# Patient Record
Sex: FEMALE | Race: WHITE | NOT HISPANIC OR LATINO | URBAN - METROPOLITAN AREA
[De-identification: names, ages, dates, MRNs, and addresses within clinical notes are randomized per-mention and may not be internally consistent; named-entity substitution may affect disease eponyms.]

---

## 2017-01-25 ENCOUNTER — COMMUNICATION - HEALTHEAST (OUTPATIENT)
Dept: FAMILY MEDICINE | Facility: CLINIC | Age: 62
End: 2017-01-25

## 2017-02-13 ENCOUNTER — COMMUNICATION - HEALTHEAST (OUTPATIENT)
Dept: FAMILY MEDICINE | Facility: CLINIC | Age: 62
End: 2017-02-13

## 2017-02-13 DIAGNOSIS — F32.A DEPRESSED: ICD-10-CM

## 2017-02-13 DIAGNOSIS — R52 PAIN: ICD-10-CM

## 2017-02-17 ENCOUNTER — HOSPITAL ENCOUNTER (OUTPATIENT)
Dept: MAMMOGRAPHY | Facility: HOSPITAL | Age: 62
Discharge: HOME | End: 2017-02-17
Attending: FAMILY MEDICINE

## 2017-02-17 DIAGNOSIS — Z12.31 VISIT FOR SCREENING MAMMOGRAM: ICD-10-CM

## 2017-03-13 ENCOUNTER — COMMUNICATION - HEALTHEAST (OUTPATIENT)
Dept: FAMILY MEDICINE | Facility: CLINIC | Age: 62
End: 2017-03-13

## 2017-03-13 DIAGNOSIS — R52 PAIN: ICD-10-CM

## 2017-04-10 ENCOUNTER — COMMUNICATION - HEALTHEAST (OUTPATIENT)
Dept: FAMILY MEDICINE | Facility: CLINIC | Age: 62
End: 2017-04-10

## 2017-04-10 DIAGNOSIS — E55.9 VITAMIN D DEFICIENCY DISEASE: ICD-10-CM

## 2017-05-15 ENCOUNTER — COMMUNICATION - HEALTHEAST (OUTPATIENT)
Dept: FAMILY MEDICINE | Facility: CLINIC | Age: 62
End: 2017-05-15

## 2017-05-15 DIAGNOSIS — K21.9 GERD (GASTROESOPHAGEAL REFLUX DISEASE): ICD-10-CM

## 2017-09-25 ENCOUNTER — COMMUNICATION - HEALTHEAST (OUTPATIENT)
Dept: FAMILY MEDICINE | Facility: CLINIC | Age: 62
End: 2017-09-25

## 2017-09-25 DIAGNOSIS — E55.9 VITAMIN D DEFICIENCY DISEASE: ICD-10-CM

## 2017-10-30 ENCOUNTER — COMMUNICATION - HEALTHEAST (OUTPATIENT)
Dept: FAMILY MEDICINE | Facility: CLINIC | Age: 62
End: 2017-10-30

## 2017-10-30 ENCOUNTER — OFFICE VISIT - HEALTHEAST (OUTPATIENT)
Dept: FAMILY MEDICINE | Facility: CLINIC | Age: 62
End: 2017-10-30

## 2017-10-30 DIAGNOSIS — G89.29 CHRONIC BILATERAL LOW BACK PAIN WITHOUT SCIATICA: ICD-10-CM

## 2017-10-30 DIAGNOSIS — F33.2 SEVERE EPISODE OF RECURRENT MAJOR DEPRESSIVE DISORDER, WITHOUT PSYCHOTIC FEATURES (H): ICD-10-CM

## 2017-10-30 DIAGNOSIS — Z23 NEED FOR VACCINATION: ICD-10-CM

## 2017-10-30 DIAGNOSIS — F41.1 ANXIETY STATE: ICD-10-CM

## 2017-10-30 DIAGNOSIS — Z12.11 SCREEN FOR COLON CANCER: ICD-10-CM

## 2017-10-30 DIAGNOSIS — K21.9 GASTROESOPHAGEAL REFLUX DISEASE WITHOUT ESOPHAGITIS: ICD-10-CM

## 2017-10-30 DIAGNOSIS — Z00.00 WELL ADULT EXAM: ICD-10-CM

## 2017-10-30 DIAGNOSIS — E78.49 OTHER HYPERLIPIDEMIA: ICD-10-CM

## 2017-10-30 DIAGNOSIS — Z82.0 FAMILY HISTORY OF ALZHEIMER'S DISEASE: ICD-10-CM

## 2017-10-30 DIAGNOSIS — M54.50 CHRONIC BILATERAL LOW BACK PAIN WITHOUT SCIATICA: ICD-10-CM

## 2017-10-30 DIAGNOSIS — E55.9 VITAMIN D DEFICIENCY: ICD-10-CM

## 2017-10-30 ASSESSMENT — MIFFLIN-ST. JEOR: SCORE: 1136.71

## 2017-10-31 ENCOUNTER — COMMUNICATION - HEALTHEAST (OUTPATIENT)
Dept: FAMILY MEDICINE | Facility: CLINIC | Age: 62
End: 2017-10-31

## 2017-10-31 DIAGNOSIS — R52 PAIN: ICD-10-CM

## 2017-11-01 ENCOUNTER — AMBULATORY - HEALTHEAST (OUTPATIENT)
Dept: LAB | Facility: CLINIC | Age: 62
End: 2017-11-01

## 2017-11-01 ENCOUNTER — COMMUNICATION - HEALTHEAST (OUTPATIENT)
Dept: FAMILY MEDICINE | Facility: CLINIC | Age: 62
End: 2017-11-01

## 2017-11-01 ENCOUNTER — AMBULATORY - HEALTHEAST (OUTPATIENT)
Dept: FAMILY MEDICINE | Facility: CLINIC | Age: 62
End: 2017-11-01

## 2017-11-01 DIAGNOSIS — M54.50 LOW BACK PAIN: ICD-10-CM

## 2017-11-01 DIAGNOSIS — E78.49 OTHER HYPERLIPIDEMIA: ICD-10-CM

## 2017-11-01 DIAGNOSIS — E55.9 VITAMIN D DEFICIENCY: ICD-10-CM

## 2017-11-01 LAB
CHOLEST SERPL-MCNC: 223 MG/DL
FASTING STATUS PATIENT QL REPORTED: YES
HDLC SERPL-MCNC: 44 MG/DL
LDLC SERPL CALC-MCNC: 150 MG/DL
TRIGL SERPL-MCNC: 147 MG/DL

## 2017-11-09 ENCOUNTER — COMMUNICATION - HEALTHEAST (OUTPATIENT)
Dept: FAMILY MEDICINE | Facility: CLINIC | Age: 62
End: 2017-11-09

## 2017-11-20 ENCOUNTER — COMMUNICATION - HEALTHEAST (OUTPATIENT)
Dept: FAMILY MEDICINE | Facility: CLINIC | Age: 62
End: 2017-11-20

## 2017-11-20 DIAGNOSIS — F32.A DEPRESSED: ICD-10-CM

## 2017-11-20 DIAGNOSIS — K21.9 GERD (GASTROESOPHAGEAL REFLUX DISEASE): ICD-10-CM

## 2018-01-23 ENCOUNTER — COMMUNICATION - HEALTHEAST (OUTPATIENT)
Dept: FAMILY MEDICINE | Facility: CLINIC | Age: 63
End: 2018-01-23

## 2018-01-23 DIAGNOSIS — E55.9 VITAMIN D DEFICIENCY DISEASE: ICD-10-CM

## 2018-06-04 ENCOUNTER — COMMUNICATION - HEALTHEAST (OUTPATIENT)
Dept: FAMILY MEDICINE | Facility: CLINIC | Age: 63
End: 2018-06-04

## 2018-06-04 DIAGNOSIS — R52 PAIN: ICD-10-CM

## 2018-07-09 ENCOUNTER — COMMUNICATION - HEALTHEAST (OUTPATIENT)
Dept: FAMILY MEDICINE | Facility: CLINIC | Age: 63
End: 2018-07-09

## 2018-07-09 DIAGNOSIS — F32.A DEPRESSED: ICD-10-CM

## 2018-07-09 DIAGNOSIS — K21.9 GASTROESOPHAGEAL REFLUX DISEASE WITHOUT ESOPHAGITIS: ICD-10-CM

## 2018-08-06 ENCOUNTER — COMMUNICATION - HEALTHEAST (OUTPATIENT)
Dept: FAMILY MEDICINE | Facility: CLINIC | Age: 63
End: 2018-08-06

## 2018-08-06 DIAGNOSIS — R52 PAIN: ICD-10-CM

## 2018-10-01 ENCOUNTER — COMMUNICATION - HEALTHEAST (OUTPATIENT)
Dept: FAMILY MEDICINE | Facility: CLINIC | Age: 63
End: 2018-10-01

## 2018-10-01 DIAGNOSIS — E55.9 VITAMIN D DEFICIENCY DISEASE: ICD-10-CM

## 2018-10-01 DIAGNOSIS — R52 PAIN: ICD-10-CM

## 2018-10-10 ENCOUNTER — RECORDS - HEALTHEAST (OUTPATIENT)
Dept: GENERAL RADIOLOGY | Facility: CLINIC | Age: 63
End: 2018-10-10

## 2018-10-10 ENCOUNTER — OFFICE VISIT - HEALTHEAST (OUTPATIENT)
Dept: FAMILY MEDICINE | Facility: CLINIC | Age: 63
End: 2018-10-10

## 2018-10-10 DIAGNOSIS — F33.2 SEVERE EPISODE OF RECURRENT MAJOR DEPRESSIVE DISORDER (H): ICD-10-CM

## 2018-10-10 DIAGNOSIS — M25.551 RIGHT HIP PAIN: ICD-10-CM

## 2018-10-10 DIAGNOSIS — M25.551 PAIN IN RIGHT HIP: ICD-10-CM

## 2018-10-10 DIAGNOSIS — R41.3 MEMORY DIFFICULTY: ICD-10-CM

## 2018-10-10 DIAGNOSIS — M54.50 LOW BACK PAIN: ICD-10-CM

## 2018-10-10 DIAGNOSIS — F41.1 ANXIETY STATE: ICD-10-CM

## 2018-10-10 DIAGNOSIS — K21.9 ESOPHAGEAL REFLUX: ICD-10-CM

## 2018-10-10 DIAGNOSIS — J32.9 SINUSITIS: ICD-10-CM

## 2018-10-10 DIAGNOSIS — Z82.0 FAMILY HISTORY OF ALZHEIMER'S DISEASE: ICD-10-CM

## 2018-10-10 DIAGNOSIS — F41.0 PANIC DISORDER WITHOUT AGORAPHOBIA: ICD-10-CM

## 2018-10-10 ASSESSMENT — MIFFLIN-ST. JEOR: SCORE: 1148.9

## 2018-10-17 ENCOUNTER — HOSPITAL ENCOUNTER (OUTPATIENT)
Dept: MAMMOGRAPHY | Facility: CLINIC | Age: 63
Discharge: HOME | End: 2018-10-17
Attending: FAMILY MEDICINE

## 2018-10-17 DIAGNOSIS — Z12.31 VISIT FOR SCREENING MAMMOGRAM: ICD-10-CM

## 2018-10-18 ENCOUNTER — OFFICE VISIT - HEALTHEAST (OUTPATIENT)
Dept: FAMILY MEDICINE | Facility: CLINIC | Age: 63
End: 2018-10-18

## 2018-10-18 DIAGNOSIS — J01.40 ACUTE NON-RECURRENT PANSINUSITIS: ICD-10-CM

## 2018-10-18 ASSESSMENT — MIFFLIN-ST. JEOR: SCORE: 1148.9

## 2018-11-06 ENCOUNTER — OFFICE VISIT - HEALTHEAST (OUTPATIENT)
Dept: FAMILY MEDICINE | Facility: CLINIC | Age: 63
End: 2018-11-06

## 2018-11-06 DIAGNOSIS — F33.2 SEVERE EPISODE OF RECURRENT MAJOR DEPRESSIVE DISORDER (H): ICD-10-CM

## 2018-11-06 DIAGNOSIS — R51.9 HEADACHE: ICD-10-CM

## 2018-11-06 DIAGNOSIS — F41.1 ANXIETY STATE: ICD-10-CM

## 2018-11-06 DIAGNOSIS — Z12.11 SCREEN FOR COLON CANCER: ICD-10-CM

## 2018-11-06 DIAGNOSIS — Z82.0 FAMILY HISTORY OF ALZHEIMER'S DISEASE: ICD-10-CM

## 2018-11-06 DIAGNOSIS — F09 COGNITIVE DISORDER: ICD-10-CM

## 2018-11-06 DIAGNOSIS — E78.5 HYPERLIPEMIA: ICD-10-CM

## 2018-11-06 DIAGNOSIS — Z00.01 ENCOUNTER FOR GENERAL ADULT MEDICAL EXAMINATION WITH ABNORMAL FINDINGS: ICD-10-CM

## 2018-11-06 DIAGNOSIS — F60.9 PERSONALITY DISORDER (H): ICD-10-CM

## 2018-11-06 DIAGNOSIS — Z23 NEED FOR VACCINATION: ICD-10-CM

## 2018-11-06 DIAGNOSIS — E55.9 VITAMIN D DEFICIENCY: ICD-10-CM

## 2018-11-06 DIAGNOSIS — F41.0 PANIC DISORDER WITHOUT AGORAPHOBIA: ICD-10-CM

## 2018-11-06 DIAGNOSIS — K21.9 ESOPHAGEAL REFLUX: ICD-10-CM

## 2018-11-06 DIAGNOSIS — K21.9 GASTROESOPHAGEAL REFLUX DISEASE WITHOUT ESOPHAGITIS: ICD-10-CM

## 2018-11-06 DIAGNOSIS — F39 MOOD DISORDER (H): ICD-10-CM

## 2018-11-06 DIAGNOSIS — M65.30 TRIGGER FINGER, ACQUIRED: ICD-10-CM

## 2018-11-06 DIAGNOSIS — M54.50 LOW BACK PAIN: ICD-10-CM

## 2018-11-06 LAB
ALBUMIN SERPL-MCNC: 3.8 G/DL (ref 3.5–5)
ALP SERPL-CCNC: 115 U/L (ref 45–120)
ALT SERPL W P-5'-P-CCNC: 23 U/L (ref 0–45)
ANION GAP SERPL CALCULATED.3IONS-SCNC: 9 MMOL/L (ref 5–18)
AST SERPL W P-5'-P-CCNC: 17 U/L (ref 0–40)
BILIRUB SERPL-MCNC: 0.5 MG/DL (ref 0–1)
BUN SERPL-MCNC: 12 MG/DL (ref 8–22)
CALCIUM SERPL-MCNC: 9.6 MG/DL (ref 8.5–10.5)
CHLORIDE BLD-SCNC: 104 MMOL/L (ref 98–107)
CHOLEST SERPL-MCNC: 246 MG/DL
CO2 SERPL-SCNC: 26 MMOL/L (ref 22–31)
CREAT SERPL-MCNC: 0.83 MG/DL (ref 0.6–1.1)
FASTING STATUS PATIENT QL REPORTED: YES
GFR SERPL CREATININE-BSD FRML MDRD: >60 ML/MIN/1.73M2
GLUCOSE BLD-MCNC: 93 MG/DL (ref 70–125)
HDLC SERPL-MCNC: 50 MG/DL
LDLC SERPL CALC-MCNC: 161 MG/DL
POTASSIUM BLD-SCNC: 4.5 MMOL/L (ref 3.5–5)
PROT SERPL-MCNC: 6.8 G/DL (ref 6–8)
SODIUM SERPL-SCNC: 139 MMOL/L (ref 136–145)
TRIGL SERPL-MCNC: 174 MG/DL

## 2018-11-06 ASSESSMENT — MIFFLIN-ST. JEOR: SCORE: 1154.01

## 2018-11-07 ENCOUNTER — COMMUNICATION - HEALTHEAST (OUTPATIENT)
Dept: FAMILY MEDICINE | Facility: CLINIC | Age: 63
End: 2018-11-07

## 2018-11-07 LAB — 25(OH)D3 SERPL-MCNC: 36.3 NG/ML (ref 30–80)

## 2018-11-08 ENCOUNTER — COMMUNICATION - HEALTHEAST (OUTPATIENT)
Dept: FAMILY MEDICINE | Facility: CLINIC | Age: 63
End: 2018-11-08

## 2018-11-14 ENCOUNTER — COMMUNICATION - HEALTHEAST (OUTPATIENT)
Dept: FAMILY MEDICINE | Facility: CLINIC | Age: 63
End: 2018-11-14

## 2018-11-14 DIAGNOSIS — Z23 NEED FOR VACCINATION: ICD-10-CM

## 2018-11-15 ENCOUNTER — AMBULATORY - HEALTHEAST (OUTPATIENT)
Dept: NURSING | Facility: CLINIC | Age: 63
End: 2018-11-15

## 2018-11-19 ENCOUNTER — COMMUNICATION - HEALTHEAST (OUTPATIENT)
Dept: FAMILY MEDICINE | Facility: CLINIC | Age: 63
End: 2018-11-19

## 2019-01-02 ENCOUNTER — RECORDS - HEALTHEAST (OUTPATIENT)
Dept: ADMINISTRATIVE | Facility: OTHER | Age: 64
End: 2019-01-02

## 2019-01-03 ENCOUNTER — RECORDS - HEALTHEAST (OUTPATIENT)
Dept: ADMINISTRATIVE | Facility: OTHER | Age: 64
End: 2019-01-03

## 2019-01-03 ENCOUNTER — AMBULATORY - HEALTHEAST (OUTPATIENT)
Dept: NEUROLOGY | Facility: CLINIC | Age: 64
End: 2019-01-03

## 2019-01-03 ENCOUNTER — RECORDS - HEALTHEAST (OUTPATIENT)
Dept: LAB | Facility: HOSPITAL | Age: 64
End: 2019-01-03

## 2019-01-03 DIAGNOSIS — R41.89 SUBJECTIVE MEMORY COMPLAINTS: ICD-10-CM

## 2019-01-03 LAB
TSH SERPL DL<=0.005 MIU/L-ACNC: 2.33 UIU/ML (ref 0.3–5)
VIT B12 SERPL-MCNC: 389 PG/ML (ref 213–816)

## 2019-01-04 ENCOUNTER — COMMUNICATION - HEALTHEAST (OUTPATIENT)
Dept: FAMILY MEDICINE | Facility: CLINIC | Age: 64
End: 2019-01-04

## 2019-01-04 DIAGNOSIS — F32.A DEPRESSION: ICD-10-CM

## 2019-01-04 DIAGNOSIS — R52 PAIN: ICD-10-CM

## 2019-01-04 DIAGNOSIS — K21.9 GASTROESOPHAGEAL REFLUX DISEASE WITHOUT ESOPHAGITIS: ICD-10-CM

## 2019-01-04 DIAGNOSIS — E55.9 VITAMIN D DEFICIENCY DISEASE: ICD-10-CM

## 2019-01-08 ENCOUNTER — RECORDS - HEALTHEAST (OUTPATIENT)
Dept: ADMINISTRATIVE | Facility: OTHER | Age: 64
End: 2019-01-08

## 2019-01-08 ENCOUNTER — COMMUNICATION - HEALTHEAST (OUTPATIENT)
Dept: FAMILY MEDICINE | Facility: CLINIC | Age: 64
End: 2019-01-08

## 2019-01-08 DIAGNOSIS — R52 PAIN: ICD-10-CM

## 2019-01-18 ENCOUNTER — COMMUNICATION - HEALTHEAST (OUTPATIENT)
Dept: ADMINISTRATIVE | Facility: CLINIC | Age: 64
End: 2019-01-18

## 2019-02-01 ENCOUNTER — HOSPITAL ENCOUNTER (OUTPATIENT)
Dept: NEUROLOGY | Facility: CLINIC | Age: 64
Setting detail: THERAPIES SERIES
Discharge: STILL ADMITTED OR ADMINISTRATIVE | End: 2019-02-01
Attending: PSYCHIATRY & NEUROLOGY

## 2019-02-01 DIAGNOSIS — F41.1 ANXIETY STATE: ICD-10-CM

## 2019-02-01 DIAGNOSIS — E78.49 OTHER HYPERLIPIDEMIA: ICD-10-CM

## 2019-02-01 DIAGNOSIS — F33.9 EPISODE OF RECURRENT MAJOR DEPRESSIVE DISORDER, UNSPECIFIED DEPRESSION EPISODE SEVERITY (H): ICD-10-CM

## 2019-02-01 DIAGNOSIS — R41.89 SUBJECTIVE MEMORY COMPLAINTS: ICD-10-CM

## 2019-02-01 DIAGNOSIS — Z85.3 HISTORY OF BREAST CANCER: ICD-10-CM

## 2019-03-07 ENCOUNTER — RECORDS - HEALTHEAST (OUTPATIENT)
Dept: ADMINISTRATIVE | Facility: OTHER | Age: 64
End: 2019-03-07

## 2019-03-08 ENCOUNTER — HOSPITAL ENCOUNTER (OUTPATIENT)
Dept: NEUROLOGY | Facility: CLINIC | Age: 64
Setting detail: THERAPIES SERIES
Discharge: STILL ADMITTED OR ADMINISTRATIVE | End: 2019-03-08
Attending: PSYCHIATRY & NEUROLOGY

## 2019-03-08 DIAGNOSIS — F60.9 PERSONALITY DISORDER (H): ICD-10-CM

## 2019-03-08 DIAGNOSIS — F32.9 MAJOR DEPRESSIVE DISORDER WITH CURRENT ACTIVE EPISODE, UNSPECIFIED DEPRESSION EPISODE SEVERITY, UNSPECIFIED WHETHER RECURRENT: ICD-10-CM

## 2019-03-08 DIAGNOSIS — F41.0 PANIC DISORDER WITHOUT AGORAPHOBIA: ICD-10-CM

## 2019-03-08 DIAGNOSIS — F41.1 ANXIETY STATE: ICD-10-CM

## 2019-03-08 DIAGNOSIS — Z85.3 HISTORY OF BREAST CANCER: ICD-10-CM

## 2019-05-13 ENCOUNTER — COMMUNICATION - HEALTHEAST (OUTPATIENT)
Dept: FAMILY MEDICINE | Facility: CLINIC | Age: 64
End: 2019-05-13

## 2019-10-07 ENCOUNTER — OFFICE VISIT - HEALTHEAST (OUTPATIENT)
Dept: FAMILY MEDICINE | Facility: CLINIC | Age: 64
End: 2019-10-07

## 2019-10-07 DIAGNOSIS — Z13.820 SCREENING FOR OSTEOPOROSIS: ICD-10-CM

## 2019-10-07 DIAGNOSIS — Z12.83 SKIN CANCER SCREENING: ICD-10-CM

## 2019-10-07 DIAGNOSIS — F60.9 PERSONALITY DISORDER (H): ICD-10-CM

## 2019-10-07 DIAGNOSIS — Z12.11 SCREENING FOR COLON CANCER: ICD-10-CM

## 2019-10-07 DIAGNOSIS — F09 COGNITIVE DISORDER: ICD-10-CM

## 2019-10-07 DIAGNOSIS — Z12.39 SCREENING FOR BREAST CANCER: ICD-10-CM

## 2019-10-07 DIAGNOSIS — F39 MOOD DISORDER (H): ICD-10-CM

## 2019-10-07 DIAGNOSIS — K21.9 GASTROESOPHAGEAL REFLUX DISEASE WITHOUT ESOPHAGITIS: ICD-10-CM

## 2019-10-07 DIAGNOSIS — Z85.3 HISTORY OF BREAST CANCER: ICD-10-CM

## 2019-10-07 DIAGNOSIS — Z00.00 ROUTINE GENERAL MEDICAL EXAMINATION AT A HEALTH CARE FACILITY: ICD-10-CM

## 2019-10-07 DIAGNOSIS — J30.2 SEASONAL ALLERGIC RHINITIS, UNSPECIFIED TRIGGER: ICD-10-CM

## 2019-10-07 DIAGNOSIS — Z78.0 POSTMENOPAUSAL: ICD-10-CM

## 2019-10-07 DIAGNOSIS — F41.1 ANXIETY STATE: ICD-10-CM

## 2019-10-07 DIAGNOSIS — Z11.59 ENCOUNTER FOR HEPATITIS C SCREENING TEST FOR LOW RISK PATIENT: ICD-10-CM

## 2019-10-07 DIAGNOSIS — E78.49 OTHER HYPERLIPIDEMIA: ICD-10-CM

## 2019-10-07 DIAGNOSIS — F33.2 SEVERE EPISODE OF RECURRENT MAJOR DEPRESSIVE DISORDER, WITHOUT PSYCHOTIC FEATURES (H): ICD-10-CM

## 2019-10-07 DIAGNOSIS — E55.9 VITAMIN D DEFICIENCY DISEASE: ICD-10-CM

## 2019-10-07 DIAGNOSIS — Z12.31 VISIT FOR SCREENING MAMMOGRAM: ICD-10-CM

## 2019-10-07 DIAGNOSIS — M65.30 TRIGGER FINGER, ACQUIRED: ICD-10-CM

## 2019-10-07 LAB
ALBUMIN SERPL-MCNC: 3.7 G/DL (ref 3.5–5)
ALP SERPL-CCNC: 102 U/L (ref 45–120)
ALT SERPL W P-5'-P-CCNC: 21 U/L (ref 0–45)
ANION GAP SERPL CALCULATED.3IONS-SCNC: 9 MMOL/L (ref 5–18)
AST SERPL W P-5'-P-CCNC: 14 U/L (ref 0–40)
BILIRUB SERPL-MCNC: 0.3 MG/DL (ref 0–1)
BUN SERPL-MCNC: 13 MG/DL (ref 8–22)
CALCIUM SERPL-MCNC: 9.3 MG/DL (ref 8.5–10.5)
CHLORIDE BLD-SCNC: 105 MMOL/L (ref 98–107)
CHOLEST SERPL-MCNC: 222 MG/DL
CO2 SERPL-SCNC: 25 MMOL/L (ref 22–31)
CREAT SERPL-MCNC: 0.95 MG/DL (ref 0.6–1.1)
FASTING STATUS PATIENT QL REPORTED: YES
GFR SERPL CREATININE-BSD FRML MDRD: 59 ML/MIN/1.73M2
GLUCOSE BLD-MCNC: 82 MG/DL (ref 70–125)
HDLC SERPL-MCNC: 46 MG/DL
LDLC SERPL CALC-MCNC: 143 MG/DL
POTASSIUM BLD-SCNC: 4.4 MMOL/L (ref 3.5–5)
PROT SERPL-MCNC: 6.5 G/DL (ref 6–8)
SODIUM SERPL-SCNC: 139 MMOL/L (ref 136–145)
TRIGL SERPL-MCNC: 167 MG/DL

## 2019-10-07 ASSESSMENT — ANXIETY QUESTIONNAIRES
3. WORRYING TOO MUCH ABOUT DIFFERENT THINGS: NOT AT ALL
5. BEING SO RESTLESS THAT IT IS HARD TO SIT STILL: SEVERAL DAYS
6. BECOMING EASILY ANNOYED OR IRRITABLE: NOT AT ALL
7. FEELING AFRAID AS IF SOMETHING AWFUL MIGHT HAPPEN: NOT AT ALL
GAD7 TOTAL SCORE: 3
4. TROUBLE RELAXING: SEVERAL DAYS
2. NOT BEING ABLE TO STOP OR CONTROL WORRYING: NOT AT ALL
1. FEELING NERVOUS, ANXIOUS, OR ON EDGE: SEVERAL DAYS

## 2019-10-07 ASSESSMENT — MIFFLIN-ST. JEOR: SCORE: 1140.12

## 2019-10-07 ASSESSMENT — PATIENT HEALTH QUESTIONNAIRE - PHQ9: SUM OF ALL RESPONSES TO PHQ QUESTIONS 1-9: 4

## 2019-10-08 ENCOUNTER — COMMUNICATION - HEALTHEAST (OUTPATIENT)
Dept: FAMILY MEDICINE | Facility: CLINIC | Age: 64
End: 2019-10-08

## 2019-10-08 LAB
25(OH)D3 SERPL-MCNC: 51 NG/ML (ref 30–80)
HCV AB SERPL QL IA: NEGATIVE

## 2019-10-18 ENCOUNTER — RECORDS - HEALTHEAST (OUTPATIENT)
Dept: MAMMOGRAPHY | Facility: CLINIC | Age: 64
End: 2019-10-18

## 2019-10-18 ENCOUNTER — RECORDS - HEALTHEAST (OUTPATIENT)
Dept: BONE DENSITY | Facility: CLINIC | Age: 64
End: 2019-10-18

## 2019-10-18 ENCOUNTER — RECORDS - HEALTHEAST (OUTPATIENT)
Dept: ADMINISTRATIVE | Facility: OTHER | Age: 64
End: 2019-10-18

## 2019-10-18 DIAGNOSIS — Z78.0 ASYMPTOMATIC MENOPAUSAL STATE: ICD-10-CM

## 2019-10-18 DIAGNOSIS — Z12.31 ENCOUNTER FOR SCREENING MAMMOGRAM FOR MALIGNANT NEOPLASM OF BREAST: ICD-10-CM

## 2019-10-21 ENCOUNTER — COMMUNICATION - HEALTHEAST (OUTPATIENT)
Dept: FAMILY MEDICINE | Facility: CLINIC | Age: 64
End: 2019-10-21

## 2019-11-14 ENCOUNTER — AMBULATORY - HEALTHEAST (OUTPATIENT)
Dept: NURSING | Facility: CLINIC | Age: 64
End: 2019-11-14

## 2019-11-14 ENCOUNTER — COMMUNICATION - HEALTHEAST (OUTPATIENT)
Dept: FAMILY MEDICINE | Facility: CLINIC | Age: 64
End: 2019-11-14

## 2019-11-14 DIAGNOSIS — Z23 NEED FOR VACCINATION: ICD-10-CM

## 2019-11-20 ENCOUNTER — RECORDS - HEALTHEAST (OUTPATIENT)
Dept: ADMINISTRATIVE | Facility: OTHER | Age: 64
End: 2019-11-20

## 2019-12-11 ENCOUNTER — COMMUNICATION - HEALTHEAST (OUTPATIENT)
Dept: FAMILY MEDICINE | Facility: CLINIC | Age: 64
End: 2019-12-11

## 2019-12-11 DIAGNOSIS — K21.9 GASTROESOPHAGEAL REFLUX DISEASE WITHOUT ESOPHAGITIS: ICD-10-CM

## 2019-12-16 ENCOUNTER — RECORDS - HEALTHEAST (OUTPATIENT)
Dept: FAMILY MEDICINE | Facility: CLINIC | Age: 64
End: 2019-12-16

## 2019-12-16 ASSESSMENT — MIFFLIN-ST. JEOR: SCORE: 1139.83

## 2019-12-18 ENCOUNTER — COMMUNICATION - HEALTHEAST (OUTPATIENT)
Dept: FAMILY MEDICINE | Facility: CLINIC | Age: 64
End: 2019-12-18

## 2019-12-18 ENCOUNTER — ANESTHESIA - HEALTHEAST (OUTPATIENT)
Dept: SURGERY | Facility: AMBULATORY SURGERY CENTER | Age: 64
End: 2019-12-18

## 2019-12-18 DIAGNOSIS — R52 PAIN: ICD-10-CM

## 2019-12-19 ENCOUNTER — COMMUNICATION - HEALTHEAST (OUTPATIENT)
Dept: SURGERY | Facility: CLINIC | Age: 64
End: 2019-12-19

## 2019-12-19 ENCOUNTER — SURGERY - HEALTHEAST (OUTPATIENT)
Dept: SURGERY | Facility: AMBULATORY SURGERY CENTER | Age: 64
End: 2019-12-19

## 2019-12-26 ENCOUNTER — COMMUNICATION - HEALTHEAST (OUTPATIENT)
Dept: FAMILY MEDICINE | Facility: CLINIC | Age: 64
End: 2019-12-26

## 2019-12-26 DIAGNOSIS — K21.9 GASTROESOPHAGEAL REFLUX DISEASE WITHOUT ESOPHAGITIS: ICD-10-CM

## 2020-01-24 ENCOUNTER — COMMUNICATION - HEALTHEAST (OUTPATIENT)
Dept: SCHEDULING | Facility: CLINIC | Age: 65
End: 2020-01-24

## 2020-01-24 DIAGNOSIS — J01.90 ACUTE SINUSITIS WITH SYMPTOMS > 10 DAYS: ICD-10-CM

## 2020-03-15 ENCOUNTER — COMMUNICATION - HEALTHEAST (OUTPATIENT)
Dept: FAMILY MEDICINE | Facility: CLINIC | Age: 65
End: 2020-03-15

## 2020-03-15 DIAGNOSIS — F32.A DEPRESSION: ICD-10-CM

## 2020-05-28 ENCOUNTER — COMMUNICATION - HEALTHEAST (OUTPATIENT)
Dept: SCHEDULING | Facility: CLINIC | Age: 65
End: 2020-05-28

## 2020-05-28 DIAGNOSIS — Z11.59 SCREENING FOR VIRAL DISEASE: ICD-10-CM

## 2020-11-16 ENCOUNTER — COMMUNICATION - HEALTHEAST (OUTPATIENT)
Dept: FAMILY MEDICINE | Facility: CLINIC | Age: 65
End: 2020-11-16

## 2020-11-16 DIAGNOSIS — K21.9 GASTROESOPHAGEAL REFLUX DISEASE WITHOUT ESOPHAGITIS: ICD-10-CM

## 2020-11-16 DIAGNOSIS — Z78.0 POSTMENOPAUSAL: ICD-10-CM

## 2020-11-16 DIAGNOSIS — R52 PAIN: ICD-10-CM

## 2020-11-16 DIAGNOSIS — Z12.31 ENCOUNTER FOR SCREENING MAMMOGRAM FOR MALIGNANT NEOPLASM OF BREAST: ICD-10-CM

## 2020-11-16 DIAGNOSIS — F32.A DEPRESSION: ICD-10-CM

## 2020-11-16 DIAGNOSIS — J30.2 SEASONAL ALLERGIC RHINITIS, UNSPECIFIED TRIGGER: ICD-10-CM

## 2020-11-16 DIAGNOSIS — E55.9 VITAMIN D DEFICIENCY DISEASE: ICD-10-CM

## 2020-11-16 RX ORDER — FAMOTIDINE 20 MG/1
20 TABLET, FILM COATED ORAL 2 TIMES DAILY PRN
Qty: 180 TABLET | Refills: 1 | Status: SHIPPED | OUTPATIENT
Start: 2020-11-16 | End: 2021-09-10

## 2020-11-16 RX ORDER — VENLAFAXINE HYDROCHLORIDE 75 MG/1
CAPSULE, EXTENDED RELEASE ORAL
Qty: 90 CAPSULE | Refills: 1 | Status: SHIPPED | OUTPATIENT
Start: 2020-11-16

## 2020-11-16 RX ORDER — LORATADINE 10 MG/1
10 TABLET ORAL DAILY
Qty: 90 TABLET | Refills: 1 | Status: SHIPPED | OUTPATIENT
Start: 2020-11-16

## 2020-11-18 RX ORDER — IBUPROFEN 600 MG/1
TABLET, FILM COATED ORAL
Qty: 180 TABLET | Refills: 2 | Status: SHIPPED | OUTPATIENT
Start: 2020-11-18

## 2020-12-07 ENCOUNTER — TELEPHONE (OUTPATIENT)
Dept: FAMILY MEDICINE | Facility: CLINIC | Age: 65
End: 2020-12-07

## 2020-12-07 DIAGNOSIS — Z20.822 EXPOSURE TO COVID-19 VIRUS: ICD-10-CM

## 2020-12-07 DIAGNOSIS — Z20.822 EXPOSURE TO COVID-19 VIRUS: Primary | ICD-10-CM

## 2020-12-07 LAB
SARS-COV-2 RNA SPEC QL NAA+PROBE: NOT DETECTED
SPECIMEN SOURCE: NORMAL

## 2020-12-07 PROCEDURE — 99207 PR NO CHARGE LOS: CPT | Performed by: STUDENT IN AN ORGANIZED HEALTH CARE EDUCATION/TRAINING PROGRAM

## 2020-12-07 NOTE — TELEPHONE ENCOUNTER
"Patient is requesting COVID-19 PCR testing. They are currently asymptomatic, and meet the following criteria for testing per the July 14, 2020 [facility] testing guidelines: 7/14 Asymptomatic criteria: has had contact within 6 feet for >/= 15 min of COVID-19 cases. Patient was exposed Wednesday, 12/2/20.     If patient has had close contact (within 6 ft for >/= 15 min), recommend patient gets PCR testing between days 5-7 after exposure. Also recommend 14 day quarantine per [facility]:     \"All close contacts should follow a 14-day quarantine period. CDC recommends that close contacts be PCR-tested for COVID-19. Even if the result is negative, these contacts should continue to quarantine for a full 14 days after last exposure and monitor for symptoms; infection could develop at any time during the quarantine period. Repeat testing at the end of the quarantine period may be recommended for staff or residents of congregate settings; refer to setting-specific testing guidance for additional information. Specific guidance is available for health care workers who have a health care exposure and these individuals should follow that guidance; it would apply if they have a community/household contact.\"  (https://[email or web address]/communities/ep/rojas/2020/zpwt01ubfrdtxw.pdf):      Patient transferred to  to schedule 20 min test only (no charge) visit with [facility] provider or curbside test with [facility]. ./LR      "

## 2020-12-07 NOTE — TELEPHONE ENCOUNTER
[facility] phone call message- general phone call:    Reason for call: Pt calling to get scheduled for covid testing, pt has been exposed.     Return call needed: Yes    OK to leave a message on voice mail? Yes    Primary language: Not on file      needed? Not on file    Call taken on December 7, 2020 at 9:53 AM by Grisel Flores-Cardona

## 2020-12-08 ENCOUNTER — TELEPHONE (OUTPATIENT)
Dept: FAMILY MEDICINE | Facility: CLINIC | Age: 65
End: 2020-12-08

## 2020-12-08 NOTE — TELEPHONE ENCOUNTER
Informed patient of negative COVID-19 results.    If patient had close exposure to someone with known COVID-19 (within 6 ft >15 min), patient needs to quarantine for a full 14 days after last exposure. Per [facility]:    Exposure is defined as being within 6 feet for more than 15 minutes to persons with confirmed COVID-19. All close contacts should follow a 14-day quarantine period. Even if the result is negative, these contacts should continue to quarantine for a full 14 days after last exposure and monitor for symptoms; infection could develop at any time during the quarantine period. Repeat testing at the end of the quarantine period may be recommended for staff or residents of congregate settings (acute care, assisted living, skilled nursing, group homes, long-term care facilities, substance use disorder treatment centers, homeless shelters, and correctional facilities).   https://www.[email or web address]/diseases/coronavirus/hcp/eval.html    If patient continues to have ongoing exposure to someone with known COVID-19 (e.g. parent, caretaker), patient needs to quarantine for 14 days after the last exposure to this person during their (the exposure's) 10 days in quarantine. As long as the exposure has no prolonged fever or symptoms (which can extend quarantine time), patient will have to isolate for:    10 days + 14 days after date of exposure's initial symptoms for those exposed to symptomatic patients  10 days + 14 days after date of exposure's positive test for those exposed to asymptomatic patients      If patient was symptomatic at time of testing and remains symptomatic for >72 hours after time of test, can repeat COVID-19 testing.    ./LR

## 2021-05-26 ASSESSMENT — PATIENT HEALTH QUESTIONNAIRE - PHQ9: SUM OF ALL RESPONSES TO PHQ QUESTIONS 1-9: 4

## 2021-05-28 ASSESSMENT — ANXIETY QUESTIONNAIRES: GAD7 TOTAL SCORE: 3

## 2021-05-31 VITALS — BODY MASS INDEX: 22.55 KG/M2 | WEIGHT: 135.31 LBS | HEIGHT: 65 IN

## 2021-06-02 VITALS — HEIGHT: 65 IN | BODY MASS INDEX: 22.99 KG/M2 | WEIGHT: 138 LBS

## 2021-06-02 VITALS — BODY MASS INDEX: 22.99 KG/M2 | HEIGHT: 65 IN | WEIGHT: 138 LBS

## 2021-06-02 VITALS — BODY MASS INDEX: 23.18 KG/M2 | WEIGHT: 139.13 LBS | HEIGHT: 65 IN

## 2021-06-02 NOTE — PROGRESS NOTES
Assessment and Plan:       1. Routine general medical examination at a health care facility  I encouraged her to eat a healthy diet and exercise on a regular basis.  We are going to check fasting labs today.    2. Severe episode of recurrent major depressive disorder, without psychotic features (H)  Well controlled and stable on venlafaxine XR 75 mg daily.    3. Anxiety Disorder NOS  Stable on venlafaxine    4. Personality disorder (H)  Stable on venlafaxine    5. Mood disorder (H)  Stable on venlafaxine    6. Cognitive disorder  Stable on venlafaxine    7. Other hyperlipidemia  I encouraged her to eat healthy foods and get regular exercise.  We are going to check a CMP and cholesterol panel today.  - Comprehensive Metabolic Panel  - Lipid Cascade    8. Trigger Finger (Acquired)  She was given a referral to see an orthopedic hand specialist.  - Ambulatory referral to Orthopedic Surgery    9. Vitamin D deficiency disease  Continue the vitamin D supplement.  I also recommended she add a calcium supplement and we will be checking a DEXA scan.  - cholecalciferol, vitamin D3, (VITAMIN D3) 2,000 unit capsule; Take 1 capsule (2,000 Units total) by mouth daily.  Dispense: 90 capsule; Refill: 3  - calcium carbonate (OS-MANNY) 600 mg calcium (1,500 mg) tablet; Take 1 tablet (600 mg total) by mouth 2 (two) times a day.  Dispense: 180 tablet; Refill: 3  - Vitamin D, Total (25-Hydroxy)    10. Gastroesophageal reflux disease without esophagitis  Well-controlled with ranitidine as needed.  - ranitidine (ZANTAC) 150 MG tablet; Take 1 tablet (150 mg total) by mouth 2 (two) times a day.  Dispense: 180 tablet; Refill: 3    11. Seasonal allergic rhinitis, unspecified trigger  Stable on loratadine as needed  - loratadine (CLARITIN) 10 mg tablet; Take 1 tablet (10 mg total) by mouth daily.  Dispense: 30 tablet; Refill: 3    12. Screening for osteoporosis  We will be checking a DEXA scan.    13. Visit for screening mammogram  - Mammo  Screening Bilateral; Future    14. History of breast cancer  She will be getting another mammogram soon.    16. Encounter for hepatitis C screening test for low risk patient  - Hepatitis C Antibody (Anti-HCV)    17. Postmenopausal  - DXA Bone Density Scan; Future    18. Screening for colon cancer  - Ambulatory referral for Colonoscopy    19. Skin cancer screening  - Ambulatory referral to Dermatology     The patient's current medical problems were reviewed.    The following are part of a depression follow up plan for the patient:  mental health care assessment  The following health maintenance schedule was reviewed with the patient and provided in printed form in the after visit summary:   Health Maintenance   Topic Date Due     HEPATITIS C SCREENING  1955     COLONOSCOPY  05/30/2003     DXA SCAN  05/30/2018     MAMMOGRAM  10/17/2019     PNEUMOCOCCAL IMMUNIZATION 65+ LOW/MEDIUM RISK (2 of 2 - PPSV23) 11/06/2019     DEPRESSION FOLLOW UP  04/07/2020     MEDICARE ANNUAL WELLNESS VISIT  10/07/2020     FALL RISK ASSESSMENT  10/07/2020     ADVANCE CARE PLANNING  11/06/2023     TD 18+ HE  09/14/2029     INFLUENZA VACCINE RULE BASED  Completed     ZOSTER VACCINES  Completed        Subjective:   Chief Complaint: Candance M Brown is an 64 y.o. female here for an Annual Wellness visit.   HPI:  She has a history of anxiety, depression, panic episodes, hyperlipidemia, GERD, breast cancer, seasonal allergies, chronic low back pain, trigger fingers and vitamin D deficiency.  She denies having concerns regarding urination, bowel movements, sleep or mood.  She is currently on venlafaxine 75 mg daily which is working well for her.  She was seen by neurology and had a neuropsychological evaluation last spring due to concerns about memory difficulty.  It was felt that her memory struggles were most likely related to stress and anxiety.  His part of her work-up she had a vitamin B12 and TSH level checked which were normal on  1/3/2019.  On 11/6/2018 her LDL cholesterol was 161, CMP normal, vitamin D level 36.3.    Overall, she reports that she is feeling healthy.  She takes ranitidine as needed for heartburn and loratadine for allergies which have been helpful.  She is concerned about ongoing issues with trigger fingers in both hands.  She would like to see an orthopedic surgeon to talk about treatment options.     Social history: Single.    Review of Systems:    Please see above.  The rest of the review of systems are negative for all systems.    Patient Care Team:  Saman Matamoros DO as PCP - General  Saman Matamoros DO as Assigned PCP     Patient Active Problem List   Diagnosis     Vitamin D Deficiency     Severe Recurrent Major Depression     Anxiety Disorder NOS     Panic Disorder Without Agoraphobia     Esophageal Reflux     Right hip pain     Trigger Finger (Acquired)     History of breast cancer     Hyperlipemia     Mood disorder (H)     Cognitive disorder     Personality disorder (H)     Low back pain     Seborrheic keratoses     Seasonal allergic rhinitis     Past Medical History:   Diagnosis Date     Breast cancer (H) 2003    left     Hx of radiation therapy       Past Surgical History:   Procedure Laterality Date     BREAST LUMPECTOMY Left 2003      Family History   Problem Relation Age of Onset     Dementia Mother      Dementia Maternal Aunt      Dementia Maternal Grandmother       Social History     Socioeconomic History     Marital status:      Spouse name: Not on file     Number of children: Not on file     Years of education: Not on file     Highest education level: Not on file   Occupational History     Not on file   Social Needs     Financial resource strain: Not on file     Food insecurity:     Worry: Not on file     Inability: Not on file     Transportation needs:     Medical: Not on file     Non-medical: Not on file   Tobacco Use     Smoking status: Former Smoker     Smokeless  "tobacco: Never Used     Tobacco comment: Ecig sometimes-rarely   Substance and Sexual Activity     Alcohol use: No     Drug use: No     Sexual activity: Not on file   Lifestyle     Physical activity:     Days per week: Not on file     Minutes per session: Not on file     Stress: Not on file   Relationships     Social connections:     Talks on phone: Not on file     Gets together: Not on file     Attends Scientologist service: Not on file     Active member of club or organization: Not on file     Attends meetings of clubs or organizations: Not on file     Relationship status: Not on file     Intimate partner violence:     Fear of current or ex partner: Not on file     Emotionally abused: Not on file     Physically abused: Not on file     Forced sexual activity: Not on file   Other Topics Concern     Not on file   Social History Narrative     Not on file      Current Outpatient Medications   Medication Sig Dispense Refill     cholecalciferol, vitamin D3, (VITAMIN D3) 2,000 unit capsule Take 1 capsule (2,000 Units total) by mouth daily. 90 capsule 3     ibuprofen (ADVIL,MOTRIN) 600 MG tablet Take 1 tablet (600 mg total) by mouth every 6 (six) hours as needed for pain. 180 tablet 2     loratadine (CLARITIN) 10 mg tablet Take 1 tablet (10 mg total) by mouth daily. 30 tablet 3     ranitidine (ZANTAC) 150 MG tablet Take 1 tablet (150 mg total) by mouth 2 (two) times a day. 180 tablet 3     venlafaxine (EFFEXOR-XR) 75 MG 24 hr capsule Take 1 capsule (75 mg total) by mouth daily. 90 capsule 3     calcium carbonate (OS-MANNY) 600 mg calcium (1,500 mg) tablet Take 1 tablet (600 mg total) by mouth 2 (two) times a day. 180 tablet 3     No current facility-administered medications for this visit.       Objective:   Vital Signs:   Visit Vitals  /76 (Patient Site: Left Arm, Patient Position: Sitting, Cuff Size: Adult Regular)   Pulse 64   Temp 97.7  F (36.5  C) (Oral)   Resp 16   Ht 5' 4.5\" (1.638 m)   Wt 136 lb 1 oz (61.7 kg) "   BMI 22.99 kg/m         VisionScreening:  No exam data present     PHYSICAL EXAM  General Appearance: Alert, cooperative, no distress, appears stated age  Head: Normocephalic, without obvious abnormality, atraumatic.  Sinuses mildly tender to palpation  Eyes: PERRL, conjunctiva/corneas clear, EOM's intact  Ears: Normal TM's and external ear canals, both ears  Nose: Nares normal, septum midline,mucosa normal, no drainage  Throat: Lips, mucosa, and tongue normal; teeth and gums normal  Neck: Supple, symmetrical, trachea midline, no adenopathy;  thyroid: not enlarged, symmetric, no tenderness/mass/nodules  Back: Symmetric, no curvature, ROM normal, no CVA tenderness  Lungs: Clear to auscultation bilaterally, respirations unlabored  Breasts: No breast masses, tenderness, asymmetry, or nipple discharge.  Heart: Regular rate and rhythm, S1 and S2 normal, no murmur, rub, or gallop   Abdomen: Soft, non-tender, bowel sounds active all four quadrants,  no masses, no organomegaly  Pelvic:Not examined  Extremities: Extremities atraumatic, no cyanosis or edema.  There are a few small nodules just proximal to the right and left third and fourth fingers.  Skin: Skin color, texture, turgor normal, no rashes or lesions  Lymph nodes: Cervical, supraclavicular, and axillary nodes normal  Neurologic: Alert.  Normal speech.  No focal deficits.  Deep tendon reflexes normal bilaterally  Psych: Appears moderately anxious  PHQ-9 score is 4.  She denies any suicidal ideations.  FRANK-7 score is 3.    Assessment Results 10/7/2019   Activities of Daily Living No help needed   Instrumental Activities of Daily Living No help needed   Mini Cog Total Score 3   Some recent data might be hidden     A Mini-Cog score of 0-2 suggests the possibility of dementia, score of 3-5 suggests no dementia    Identified Health Risks:     She is at risk for lack of exercise and has been provided with information to increase physical activity for the benefit of her  well-being.  The patient was counseled and encouraged to consider modifying their diet and eating habits. She was provided with information on recommended healthy diet options.  The patient s PHQ-9 score is consistent with moderate depression.  She was provided with information regarding depression and was advised to schedule a follow up appointment as needed to further address this issue.  Information regarding advance directives (living herron), including where she can download the appropriate form, was provided to the patient via the AVS.        no

## 2021-06-03 VITALS
TEMPERATURE: 97.7 F | BODY MASS INDEX: 22.67 KG/M2 | WEIGHT: 136.06 LBS | RESPIRATION RATE: 16 BRPM | HEART RATE: 64 BPM | SYSTOLIC BLOOD PRESSURE: 124 MMHG | HEIGHT: 65 IN | DIASTOLIC BLOOD PRESSURE: 76 MMHG

## 2021-06-03 NOTE — TELEPHONE ENCOUNTER
Pt has  Had 3 pneumo shots she had 13 in 2018 and 23 in 2003 and 2013 I dont think she needs any more   We can give her a 23 today to complete a 65+ series but I dont think CDC recommends one if you've had 2 in the past but I pended one if you think it wouldn't hurt and would make her feel better about completing the series

## 2021-06-04 VITALS — WEIGHT: 136 LBS | HEIGHT: 65 IN | BODY MASS INDEX: 22.66 KG/M2

## 2021-06-04 NOTE — TELEPHONE ENCOUNTER
Please contact this patient and let her know that she should stop the ranitidine and we should switch to something different.  She could try Pepcid or Tums to see if this helps.  If so, I can send a prescription to her mail-in pharmacy.  If these are not helpful we could go back to using omeprazole, but I would only recommend she use this on an as-needed basis.  Thank you, DE

## 2021-06-04 NOTE — TELEPHONE ENCOUNTER
Medication Question or Clarification  Who is calling: Patient  What medication are you calling about? (include dose and sig)    Disp Refills Start End    ranitidine (ZANTAC) 150 MG tablet 180 tablet 3 10/7/2019     Sig - Route: Take 1 tablet (150 mg total) by mouth 2 (two) times a day. - Oral    Sent to pharmacy as: raNITIdine 150 mg tablet (ZANTAC)    E-Prescribing Status: Receipt confirmed by pharmacy (10/7/2019  8:18 AM CDT)      Who prescribed the medication?: Saman Matamoros, DO  What is your question/concern?: Patient stated that she was informed that this medication is recalled. Patient is questioning if Saman Matamoros, DO can prescribe an alternative for this medication.  Pharmacy: [facility] mail  Okay to leave a detailed message?: Yes, [phone]  Site CMT - Please call the pharmacy to obtain any additional needed information.

## 2021-06-04 NOTE — TELEPHONE ENCOUNTER
Medication Question or Clarification  Who is calling: Patient  What medication are you calling about? (include dose and sig)    Disp Refills Start End    lansoprazole (PREVACID) 30 MG capsule 90 capsule 3 12/11/2019     Sig - Route: Take 1 capsule (30 mg total) by mouth daily. - Oral    Sent to pharmacy as: lansoprazole 30 mg capsule,delayed release (Prevacid)    E-Prescribing Status: Receipt confirmed by pharmacy (12/11/2019  5:00 PM CST)      Who prescribed the medication?: Saman Matamoros, DO   What is your question/concern?: Patient stated that this medication would cost her $40. Patient stated that she was informed by the pharmacist that famotidine would cost her $0. Patient is questioning if Saman Matamoros, DO would be ok sending a prescription for famotidine.   [facility]  Okay to leave a detailed message?: Yes, [phone]  Site CMT - Please call the pharmacy to obtain any additional needed information.

## 2021-06-04 NOTE — ANESTHESIA CARE TRANSFER NOTE
Last vitals:   Vitals:    12/19/19 1034   BP: (P) 122/57   Pulse: (P) 74   Resp: (P) 16   Temp: (P) 36.3  C (97.4  F)   SpO2: (P) 99%     Patient's level of consciousness is drowsy  Spontaneous respirations: yes  Maintains airway independently: yes  Dentition unchanged: yes  Oropharynx: oropharynx clear of all foreign objects    QCDR Measures:  ASA# 20 - Surgical Safety Checklist: WHO surgical safety checklist completed prior to induction    PQRS# 430 - Adult PONV Prevention: 4558F - Pt received => 2 anti-emetic agents (different classes) preop & intraop  ASA# 8 - Peds PONV Prevention: NA - Not pediatric patient, not GA or 2 or more risk factors NOT present  PQRS# 424 - Debra-op Temp Management: 4559F - At least one body temp DOCUMENTED => 35.5C or 95.9F within required timeframe  PQRS# 426 - PACU Transfer Protocol: - Transfer of care checklist used  ASA# 14 - Acute Post-op Pain: ASA14B - Patient did NOT experience pain >= 7 out of 10

## 2021-06-04 NOTE — ANESTHESIA PREPROCEDURE EVALUATION
"Anesthesia Evaluation      Patient summary reviewed     Airway   Mallampati: II  Neck ROM: full   Pulmonary     breath sounds clear to auscultation                         Cardiovascular   Exercise tolerance: > or = 4 METS  Rhythm: regular  Rate: normal,         Neuro/Psych    (+) anxiety/panic attacks,     Endo/Other       GI/Hepatic/Renal    (+) GERD,        Other findings: Vitamin D Deficiency  Severe Recurrent Major Depression  Anxiety Disorder NOS  Panic Disorder Without Agoraphobia  Esophageal Reflux  Right hip pain  Trigger Finger (Acquired)  History of breast cancer  Hyperlipemia  Mood disorder (H)  Cognitive disorder  Personality disorder (H)  Low back pain  Seborrheic keratoses  Seasonal allergic rhinitis     Anxiety, states she would like to be \"out\" before going down the arias.  Reassurance provided that she would be comfortable once received in the OR.      Dental                         Anesthesia Plan  Planned anesthetic: MAC    ASA 2   Induction: intravenous   Anesthetic plan and risks discussed with: patient  Anesthesia plan special considerations: antiemetics,   Post-op plan: routine recovery          "

## 2021-06-04 NOTE — TELEPHONE ENCOUNTER
Patient states she called before [place]  edication Question or Clarification  Who is calling: Patient  What medication are you calling about? (include dose and sig) Lansoprazole 30 mg daily  Who prescribed the medication?: Saman Matamoros, DO  What is your question/concern?: Patient states she is unable to continue to pay $41 a month for lansoprazole.   Famotidine would be a tier one medication that would be with no pay monthly.  Please advise.   [facility] to leave a detailed message?: Yes  [phone]  Site CMT - Please call the pharmacy to obtain any additional needed information.

## 2021-06-04 NOTE — TELEPHONE ENCOUNTER
I recommend that she switch to famotidine.  I sent a new prescription to her mail-in pharmacy. -DE

## 2021-06-04 NOTE — ANESTHESIA POSTPROCEDURE EVALUATION
Patient: Candance M Brown  COLONOSCOPY  Anesthesia type: MAC    Patient location: PACU  Last vitals:   Vitals Value Taken Time   /79 12/19/2019 10:49 AM   Temp 36.3  C (97.4  F) 12/19/2019 10:34 AM   Pulse 72 12/19/2019 10:49 AM   Resp 16 12/19/2019 10:49 AM   SpO2 99 % 12/19/2019 10:49 AM     Post vital signs: stable  Level of consciousness: awake and responds to simple questions  Post-anesthesia pain: pain controlled  Post-anesthesia nausea and vomiting: no  Pulmonary: unassisted, return to baseline  Cardiovascular: stable and blood pressure at baseline  Hydration: adequate  Anesthetic events: no    QCDR Measures:  ASA# 11 - Debra-op Cardiac Arrest: ASA11B - Patient did NOT experience unanticipated cardiac arrest  ASA# 12 - Debra-op Mortality Rate: ASA12B - Patient did NOT die  ASA# 13 - PACU Re-Intubation Rate: NA - No ETT / LMA used for case  ASA# 10 - Composite Anes Safety: ASA10A - No serious adverse event    Additional Notes:

## 2021-06-04 NOTE — TELEPHONE ENCOUNTER
RN cannot approve Refill Request    RN can NOT refill this medication med is not covered by policy/route to provider. Last office visit: 10/10/2018 Saman Matamoros, DO Last Physical: 10/7/2019 Last MTM visit: Visit date not found Last visit same specialty: 10/18/2018 Zully Mixon, CNP.  Next visit within 3 mo: Visit date not found  Next physical within 3 mo: Visit date not found      Josette Charles, [facility] Triage/Med Refill 12/19/2019    Requested Prescriptions   Pending Prescriptions Disp Refills     ibuprofen (ADVIL,MOTRIN) 600 MG tablet [Pharmacy Med Name: IBUPROFEN TAB 600MG] 180 tablet 2     Sig: TAKE 1 TABLET EVERY 6 HOURSAS NEEDED FOR PAIN       There is no refill protocol information for this order

## 2021-06-04 NOTE — TELEPHONE ENCOUNTER
Pt said she has tried pepcid and tums and they dont work for her she said she needs a daily med to control her acid as it keeps her up at night and she cant function she doesn't want prilosec due to the link to Alzheimer's and her strong family history and zantac worked but is recalled her drug coverage looks like it covers lansoprazole but neither her nor I knew if that would be an option?   Please advise

## 2021-06-05 NOTE — TELEPHONE ENCOUNTER
"Triage call:   Patient is currently in [place] visiting her friend- reports that her insurance doesn't work down in [place]    UNABLE TO TRIAGE DUE TO RN LICENSING RESTRICTIONS OUT OF STATE     She reports that she has had sinus infection symptoms that have been waxing and waning for over one month now.   Productive cough for 2 weeks- green/yellow mucous   Intermittent fever- 99.9F intermittently   She reports that her \"main stem bronchi\" hurts a lot when she coughs- Moderate pain   Has had sinus in the past - has been over 3 years since last sinus infection   Nasal congestion - same color- green/yellow  Additional symptoms- shortness of breath intermittently with exertion and at rest     Will return to [place] Tuesday evening- could follow up with PCP when she returns home if necessary     PCP please review information and advise- updated pharmacy in encounter for [place] pharmacy.     Erin Fernandes RN [facility] Triage/Med Refill 1/24/2020 8:17 AM    Reason for Disposition    Nursing judgment     RN unable to triage due to licensing restrictions and patient being out of state    Protocols used: NO PROTOCOL AVAILABLE - INFORMATION ONLY-A-[place]      "

## 2021-06-05 NOTE — TELEPHONE ENCOUNTER
Please let pt know that I sent in an antibiotic to her pharmacy. She should f/u if symptoms do not improve. -DE

## 2021-06-05 NOTE — TELEPHONE ENCOUNTER
Tried Pt cell phone number, gave her DE's note and Rx info    Pt said to send thank yous to everyone who had a hand in helping her today    Said we are the most amazing clinic to work with and we always take care of her, she is so thankful!

## 2021-06-05 NOTE — TELEPHONE ENCOUNTER
Message left on identifying VM letting pt know DE got her message and sent a Rx to her pharmacy in [place]. I left our call back number and the phone number to the pharmacy we sent it to.

## 2021-06-06 NOTE — TELEPHONE ENCOUNTER
Refill Approved    Rx renewed per Medication Renewal Policy. Medication was last renewed on 1/5/19.    Janet Wilkins, [facility] Triage/Med Refill 3/16/2020     Requested Prescriptions   Pending Prescriptions Disp Refills     venlafaxine (EFFEXOR-XR) 75 MG 24 hr capsule [Pharmacy Med Name: VENLAFAXINE  CAP 75MG ER] 90 capsule 3     Sig: TAKE 1 CAPSULE DAILY       Venlafaxine/Desvenlafaxine Refill Protocol Passed - 3/15/2020  8:51 AM        Passed - LFT or AST or ALT in last year     Albumin   Date Value Ref Range Status   10/07/2019 3.7 3.5 - 5.0 g/dL Final     Bilirubin, Total   Date Value Ref Range Status   10/07/2019 0.3 0.0 - 1.0 mg/dL Final     Alkaline Phosphatase   Date Value Ref Range Status   10/07/2019 102 45 - 120 U/L Final     AST   Date Value Ref Range Status   10/07/2019 14 0 - 40 U/L Final     ALT   Date Value Ref Range Status   10/07/2019 21 0 - 45 U/L Final     Protein, Total   Date Value Ref Range Status   10/07/2019 6.5 6.0 - 8.0 g/dL Final                Passed - Fasting lipid cascade in last year     Cholesterol   Date Value Ref Range Status   10/07/2019 222 (H) <=199 mg/dL Final     Triglycerides   Date Value Ref Range Status   10/07/2019 167 (H) <=149 mg/dL Final     HDL Cholesterol   Date Value Ref Range Status   10/07/2019 46 (L) >=50 mg/dL Final     LDL Calculated   Date Value Ref Range Status   10/07/2019 143 (H) <=129 mg/dL Final     Patient Fasting > 8hrs?   Date Value Ref Range Status   10/07/2019 Yes  Final             Passed - PCP or prescribing provider visit in last year     Last office visit with prescriber/PCP: 10/10/2018 Saman Matamoros DO OR same dept: Visit date not found OR same specialty: 10/18/2018 Zully Mixon CNP  Last physical: 10/7/2019 Last MTM visit: Visit date not found   Next visit within 3 mo: Visit date not found  Next physical within 3 mo: Visit date not found  Prescriber OR PCP: Saman Anders DO  Last diagnosis  associated with med order: 1. Depression  - venlafaxine (EFFEXOR-XR) 75 MG 24 hr capsule [Pharmacy Med Name: VENLAFAXINE  CAP 75MG ER]; TAKE 1 CAPSULE DAILY  Dispense: 90 capsule; Refill: 3    If protocol passes may refill for 12 months if within 3 months of last provider visit (or a total of 15 months).             Passed - Blood Pressure in last year     BP Readings from Last 1 Encounters:   12/19/19 147/79

## 2021-06-08 NOTE — TELEPHONE ENCOUNTER
"Patient is calling requesting COVID serologic antibody testing.  NOTE: Serologic testing is a blood test for 'antibodies' which are made at 10-14 days after you have had symptoms of COVID or were exposed and had an asymptomatic infection.  This does NOT test you for 'active' infection or tell you if you are contagious.    Are you a healthcare worker?  No  Do you currently have a cough, fever, body aches, shortness of breath or difficulty breathing?   No  Did you previously have cough, fever, body aches, shortness of breath, or difficulty breathing that have now resolved? Has had previous covid symptoms.   Symptoms began 3 months ago.  Symptoms started > 14 days ago. Lab order placed per SARS-CoV-2 Serology test Standing Order using indication \"Previously symptomatic >14d since onset, currently asymptomatic\" and diagnosis code \"Screening for viral disease\" (Z11.59)      The patient was informed: \"Testing is limited each day and it may take time for testing to be available to everyone who has called. You will receive a call within 48-72 hours to schedule the serology testing. Please confirm the best number to reach you is [phone]. If you have any questions about scheduling, call [phone].\"     Angela Eduardo RN    "

## 2021-06-13 NOTE — TELEPHONE ENCOUNTER
Refill Request  Did you contact pharmacy: No  Medication name:   Requested Prescriptions     Pending Prescriptions Disp Refills     ibuprofen (ADVIL,MOTRIN) 600 MG tablet 180 tablet 2     Signed Prescriptions Disp Refills     venlafaxine (EFFEXOR-XR) 75 MG 24 hr capsule 90 capsule 1     Sig: TAKE 1 CAPSULE DAILY     Authorizing Provider: SAMAN TRIANA     loratadine (CLARITIN) 10 mg tablet 90 tablet 1     Sig: Take 1 tablet (10 mg total) by mouth daily.     Authorizing Provider: SAMAN TRIANA     famotidine (PEPCID) 20 MG tablet 180 tablet 1     Sig: Take 1 tablet (20 mg total) by mouth 2 (two) times a day as needed for heartburn.     Authorizing Provider: SAMAN TRIANA     cholecalciferol, vitamin D3, (VITAMIN D3) 50 mcg (2,000 unit) capsule 90 capsule 3     Sig: Take 1 capsule (2,000 Units total) by mouth daily.     Authorizing Provider: SAMAN TRIANA     calcium carbonate (OS-MANNY) 600 mg calcium (1,500 mg) tablet 180 tablet 3     Sig: Take 1 tablet (600 mg total) by mouth 2 (two) times a day.     Authorizing Provider: SAMAN TRIANA     Who prescribed the medication: Saman Triana,    Requested Pharmacy: [facility]  Is patient out of medication: Yes  Patient notified refills processed in 3 business days:  yes  Okay to leave a detailed message: yes. The patient states she received the other refills except the ibuprofen.

## 2021-06-13 NOTE — TELEPHONE ENCOUNTER
States that she is really sad that you are leaving to [facility] - too far for her to go.    Pt is wondering about a provider in [facility]    Informed pt of medication sent and needs to schedule AWV in next couple of months.  Pt states that she will schedule once she knows who.

## 2021-06-13 NOTE — PROGRESS NOTES
Assessment:     1. Well adult exam     2. Screen for colon cancer  Ambulatory referral for Colonoscopy   3. Severe episode of recurrent major depressive disorder, without psychotic features     4. Anxiety Disorder NOS     5. Other hyperlipidemia  Comprehensive Metabolic Panel    Lipid Cascade    CANCELED: Comprehensive Metabolic Panel    CANCELED: Lipid Cascade   6. Chronic bilateral low back pain without sciatica  Tens unit   7. Gastroesophageal reflux disease without esophagitis     8. Vitamin D deficiency  Vitamin D, Total (25-Hydroxy)    CANCELED: Vitamin D, Total (25-Hydroxy)   9. Family history of Alzheimer's disease     10. Need for vaccination  Influenza, Seasonal,Quad Inj, 36+ MOS        The following are part of a depression follow up plan for the patient:  mental health care assessment    Plan:      I encouraged her to continue exercising and eating a healthy diet.  She is going to return to the clinic to have the above labs checked when she is fasting.  We decided to stop omeprazole and start ranitidine.  I recommended that she continue venlafaxine which has been working well for her anxiety and depression symptoms.  She was given a prescription for a TENS unit to help with her chronic low back pain symptoms.  I recommended she see neurology for further evaluation regarding her concerns about developing Alzheimer's disease which has been present in several family members.     All questions answered.  Discussed healthy lifestyle modifications.  Follow up as needed.     Subjective:      Candance M Brown is a 62 y.o. female who presents for an annual exam. The patient is not sexually active. The patient participates in regular exercise: yes. The patient reports that there is not domestic violence in her life.  She denies having concerns regarding urination, bowel movements, sleep or mood.  She struggles with anxiety and depression.  She occasionally gets panic episodes.  She is currently on venlafaxine 75  mg daily which is working well for her.  She has a history of hyperlipidemia, GERD, chronic low back pain, trigger fingers and vitamin D deficiency.  She reports that she is feeling healthy and is amazed that she has not been sick this past year.    Social history: Single.    Healthy Habits:   Regular Exercise: Yes  Sunscreen Use: Yes  Healthy Diet: Yes  Dental Visits Regularly: Yes  Seat Belt: Yes  Sexually active: No  Self Breast Exam Monthly:Yes  Hemoccults: No  Flex Sig: No  Colonoscopy: unknown  Lipid Profile: 10/25/16  Glucose Screen: 10/25/16  Prevention of Osteoporosis: No  Last Dexa: No  Guns at Home:  N/A      Immunization History   Administered Date(s) Administered     Influenza, inj, historic 2013     Influenza, seasonal,quad inj 36+ mos 10/12/2015, 10/25/2016, 10/30/2017     Influenza,seasonal quad, PF 10/13/2014     Pneumo Polysac 23-V 2013     Tdap 2010     ZOSTER 2012     Immunization status: up to date and documented.    No exam data present    Gynecologic History  No LMP recorded. Patient is not currently having periods (Reason: Chemotherapy/radiation).  Contraception: none  Last Pap: 10/25/16. Results were: normal recheck 5 years  Last mammogram: 17. Results were: normal recheck 1 year      OB History    Para Term  AB Living   3 3 3      SAB TAB Ectopic Multiple Live Births             # Outcome Date GA Lbr Miguel/2nd Weight Sex Delivery Anes PTL Lv   3 Term            2 Term            1 Term                   Current Outpatient Prescriptions   Medication Sig Dispense Refill     cholecalciferol, vitamin D3, (VITAMIN D3) 2,000 unit capsule Take 1 capsule (2,000 Units total) by mouth daily. 180 capsule 0     ibuprofen (ADVIL,MOTRIN) 600 MG tablet TAKE 1 TABLET BY MOUTH EVERY 6 HOURS AS NEEDED FOR PAIN 180 tablet 0     venlafaxine (EFFEXOR-XR) 75 MG 24 hr capsule Take 1 capsule (75 mg total) by mouth daily. 90 capsule 3     ranitidine (ZANTAC) 150 MG tablet  "Take 1 tablet (150 mg total) by mouth 2 (two) times a day. 180 tablet 3     No current facility-administered medications for this visit.      Past Medical History:   Diagnosis Date     Breast cancer      Hx of radiation therapy      Past Surgical History:   Procedure Laterality Date     BREAST LUMPECTOMY Left 2003     Gallium  Family History   Problem Relation Age of Onset     Dementia Mother      Dementia Maternal Aunt      Dementia Maternal Grandmother      Social History     Social History     Marital status:      Spouse name: N/A     Number of children: N/A     Years of education: N/A     Occupational History     Not on file.     Social History Main Topics     Smoking status: Former Smoker     Smokeless tobacco: Never Used      Comment: Ecig sometimes-rarely     Alcohol use No     Drug use: No     Sexual activity: Not on file     Other Topics Concern     Not on file     Social History Narrative       Review of Systems  General:  Denies problem  Eyes: Denies problem  Ears/Nose/Throat: Denies problem  Cardiovascular: Denies problem  Respiratory:  Denies problem  Gastrointestinal:  Denies problem, Genitourinary: Denies problem  Musculoskeletal:  Denies problem  Skin: Denies problem  Neurologic: Denies problem  Psychiatric: Denies problem  Endocrine: Denies problem  Heme/Lymphatic: Denies problem   Allergic/Immunologic: Denies problem        Objective:         Vitals:    10/30/17 0804   BP: 120/70   Pulse: 64   Resp: 16   Temp: 97.8  F (36.6  C)   TempSrc: Oral   Weight: 135 lb 5 oz (61.4 kg)   Height: 5' 4.5\" (1.638 m)     Body mass index is 22.87 kg/(m^2).    Physical Exam:  General Appearance: Alert, cooperative, no distress, appears stated age  Head: Normocephalic, without obvious abnormality, atraumatic  Eyes: PERRL, conjunctiva/corneas clear, EOM's intact  Ears: Normal TM's and external ear canals, both ears  Nose: Nares normal, septum midline,mucosa normal, no drainage  Throat: Lips, mucosa, and " tongue normal; teeth and gums normal  Neck: Supple, symmetrical, trachea midline, no adenopathy;  thyroid: not enlarged, symmetric, no tenderness/mass/nodules  Back: Symmetric, no curvature, ROM normal, no CVA tenderness  Lungs: Clear to auscultation bilaterally, respirations unlabored  Breasts: No breast masses, tenderness, asymmetry, or nipple discharge.  Heart: Regular rate and rhythm, S1 and S2 normal, no murmur, rub, or gallop,   Abdomen: Soft, non-tender, bowel sounds active all four quadrants,  no masses, no organomegaly  Pelvic:Not examined  Extremities: Extremities normal, atraumatic, no cyanosis or edema  Skin: Skin color, texture, turgor normal, no rashes or lesions  Lymph nodes: Cervical, supraclavicular, and axillary nodes normal  Neurologic: Alert.  Normal speech.  No focal deficits.  Deep tendon reflexes normal bilaterally       No results found for this or any previous visit (from the past 168 hour(s)).

## 2021-06-13 NOTE — TELEPHONE ENCOUNTER
Referral Request  Type of referral: mammogram  Who s requesting: Patient  Why the request: Needs annual mammogram  Have you been seen for this request: N/A  Does patient have a preference on a group/provider? [facility]  Okay to leave a detailed message?  Yes

## 2021-06-13 NOTE — TELEPHONE ENCOUNTER
Medication Question or Clarification  Who is calling: Patient  What medication are you calling about (include dose and sig)?: All active meds  Who prescribed the medication?: Chey Anders, Saman Lopez, DO  What is your question/concern?: The patient is in need of annual refills.  She does not feel comfortable with a face to face.  Can this be done virtual?  Please call patient as she is out of venlafaxine. All orders are correct as review with patient per writer.    Requested Pharmacy: [facility]  Okay to leave a detailed message?: Yes

## 2021-06-13 NOTE — TELEPHONE ENCOUNTER
She could see Dr. Heladio Lora at the [facility].  I think he and I have similar practice styles. -DE

## 2021-06-13 NOTE — TELEPHONE ENCOUNTER
RN cannot approve Refill Request    RN can NOT refill this medication med is not covered by policy/route to provider. Last office visit: 10/10/2018 Saman Matamoros, DO Last Physical: 10/7/2019 Last MTM visit: Visit date not found Last visit same specialty: 10/18/2018 Zully Mixon, CNP.  Next visit within 3 mo: Visit date not found  Next physical within 3 mo: Visit date not found      Janet Wilkins, [facility] Triage/Med Refill 11/18/2020    Requested Prescriptions   Pending Prescriptions Disp Refills     ibuprofen (ADVIL,MOTRIN) 600 MG tablet 180 tablet 2       There is no refill protocol information for this order      Signed Prescriptions Disp Refills    venlafaxine (EFFEXOR-XR) 75 MG 24 hr capsule 90 capsule 1     Sig: TAKE 1 CAPSULE DAILY       There is no refill protocol information for this order       loratadine (CLARITIN) 10 mg tablet 90 tablet 1     Sig: Take 1 tablet (10 mg total) by mouth daily.       There is no refill protocol information for this order       famotidine (PEPCID) 20 MG tablet 180 tablet 1     Sig: Take 1 tablet (20 mg total) by mouth 2 (two) times a day as needed for heartburn.       There is no refill protocol information for this order       cholecalciferol, vitamin D3, (VITAMIN D3) 50 mcg (2,000 unit) capsule 90 capsule 3     Sig: Take 1 capsule (2,000 Units total) by mouth daily.       There is no refill protocol information for this order       calcium carbonate (OS-MANNY) 600 mg calcium (1,500 mg) tablet 180 tablet 3     Sig: Take 1 tablet (600 mg total) by mouth 2 (two) times a day.       There is no refill protocol information for this order

## 2021-06-16 PROBLEM — J30.2 SEASONAL ALLERGIC RHINITIS: Status: ACTIVE | Noted: 2019-10-07

## 2021-06-17 NOTE — PATIENT INSTRUCTIONS - HE
Patient Instructions by Saman Matamoros DO at 10/7/2019  8:00 AM     Author: Saman Matamoros DO Service: -- Author Type: Physician    Filed: 10/7/2019  8:29 AM Encounter Date: 10/7/2019 Status: Signed    : Saman Matamoros DO (Physician)         Patient Education     Exercise for a Healthier Heart  You may wonder how you can improve the health of your heart. If youre thinking about exercise, youre on the right track. You dont need to become an athlete, but you do need a certain amount of brisk exercise to help strengthen your heart. If you have been diagnosed with a heart condition, your doctor may recommend exercise to help stabilize your condition. To help make exercise a habit, choose safe, fun activities.       Be sure to check with your health care provider before starting an exercise program.    Why exercise?  Exercising regularly offers many healthy rewards. It can help you do all of the following:    Improve your blood cholesterol levels to help prevent further heart trouble    Lower your blood pressure to help prevent a stroke or heart attack    Control diabetes, or reduce your risk of getting this disease    Improve your heart and lung function    Reach and maintain a healthy weight    Make your muscles stronger and more limber so you can stay active    Prevent falls and fractures by slowing the loss of bone mass (osteoporosis)    Manage stress better  Exercise tips  Ease into your routine. Set small goals. Then build on them.  Exercise on most days. Aim for a total of 150 or more minutes of moderate to  vigorous intensity activity each week. Consider 40 minutes, 3 to 4 times a week. For best results, activity should last for 40 minutes on average. It is OK to work up to the 40 minute period over time. Examples of moderate-intensity activity is walking one mile in 15 minutes or 30 to 45 minutes of yard work.  Step up your daily activity level. Along with your  exercise program, try being more active throughout the day. Walk instead of drive. Do more household tasks or yard work.  Choose one or more activities you enjoy. Walking is one of the easiest things you can do. You can also try swimming, riding a bike, or taking an exercise class.  Stop exercising and call your doctor if you:    Have chest pain or feel dizzy or lightheaded    Feel burning, tightness, pressure, or heaviness in your chest, neck, shoulders, back, or arms    Have unusual shortness of breath    Have increased joint or muscle pain    Have palpitations or an irregular heartbeat      3497-6538 [employer]. [address] [place] [address]. All rights reserved. This information is not intended as a substitute for professional medical care. Always follow your healthcare professional's instructions.         Patient Education   Understanding [employer] MyPlate  The USDA (US Department of Agriculture) has guidelines to help you make healthy food choices. These are called MyPlate. MyPlate shows the food groups that make up healthy meals using the image of a place setting. Before you eat, think about the healthiest choices for what to put onto your plate or into your cup or bowl. To learn more about building a healthy plate, visit www.choosemyplate.gov.       The Food Groups    Fruits: Any fruit or 100% fruit juice counts as part of the Fruit Group. Fruits may be fresh, canned, frozen, or dried, and may be whole, cut-up, or pureed. Make half your plate fruits and vegetables.    Vegetables: Any vegetable or 100% vegetable juice counts as a member of the Vegetable Group. Vegetables may be fresh, frozen, canned, or dried. They can be served raw or cooked and may be whole, cut-up, or mashed. Make half your plate fruits and vegetables.     Grains: All foods made from grains are part of the Grains Group. These include wheat, rice, oats, cornmeal, and barley such as bread, pasta, oatmeal, cereal,  tortillas, and grits. Grains should be no more than a quarter of your plate. At least half of your grains should be whole grains.    Protein: This group includes meat, poultry, seafood, beans and peas, eggs, processed soy products (like tofu), nuts (including nut butters), and seeds. Make protein choices no more than a quarter of your plate. Meat and poultry choices should be lean or low fat.    Dairy: All fluid milk products and foods made from milk that contain calcium, like yogurt and cheese are part of the Dairy Group. (Foods that have little calcium, such as cream, butter, and cream cheese, are not part of the group.) Most dairy choices should be low-fat or fat-free.    Oils: These are fats that are liquid at room temperature. They include canola, corn, olive, soybean, and sunflower oil. Foods that are mainly oil include mayonnaise, certain salad dressings, and soft margarines. You should have only 5 to 7 teaspoons of oils a day. You probably already get this much from the food you eat.  Use [employer] to Help Build Your Meals  The [employer]cker can help you plan and track your meals and activity. You can look up individual foods to see or compare their nutritional value. You can get guidelines for what and how much you should eat. You can compare your food choices. And you can assess personal physical activities and see ways you can improve. Go to www.CallMD.gov/supertracker/.    0839-6131 [employer]. [address] [place] [address], [place] [address]. All rights reserved. This information is not intended as a substitute for professional medical care. Always follow your healthcare professional's instructions.           Patient Education   Depression and Suicide in Older Adults  Nearly 2 million older Americans have some type of depression. Sadly, some of them even take their own lives. Yet depression among older adults is often ignored. Learn the warning signs. You may help spare a loved one  needless pain. You may also save a life.       What Is Depression?  Depression is a mood disorder that affects the way you think and feel. The most common symptom is a feeling of deep sadness. People who are depressed also may seem tired and listless. And nothing seems to give them pleasure. Its normal to grieve or be sad sometimes. But sadness lessens or passes with time. Depression rarely goes away or improves on its own. Other symptoms of depression are:    Sleeping more or less than normal    Eating more or less than normal    Having headaches, stomachaches, or other pains that dont go away    Feeling nervous, empty, or worthless    Crying a great deal    Thinking or talking about suicide or death    Feeling confused or forgetful  What Causes It?  The causes of depression arent fully known. Certain chemicals in the brain play a role. Depression does run in families. And life stresses can also trigger depression in some people. That may be the case with older adults. They often face great burdens, such as the death of friends or a spouse. They may have failing health. And they are more likely to be alone, lonely, or poor.  How You Can Help  Often, depressed people may not want to ask for help. When they do, they may be ignored. Or, they may receive the wrong treatment. You can help by showing parents and older friends love and support. If they seem depressed, help them find the right treatment. Talk to your doctor. Or contact a local mental health center, social service agency, or hospital. With modern treatment, no one has to suffer from depression.  Resources:    National [place] of Mental Health  394.161.6094  www.nimh.nih.gov    National [place] on Mental Illness  224.664.6788  www.sonido.org    [facility] [place]  918.244.3104  www.nmha.org    National Suicide Hotline  626.498.8885 (800-SUICIDE)      2447-8322 [employer]. [address] [place] [address], [place] [address]. All rights reserved. This  information is not intended as a substitute for professional medical care. Always follow your healthcare professional's instructions.         Patient Education   Understanding Advance Care Planning  Advance care planning is the process of deciding ones own future medical care. It helps ensure that if you cant speak for yourself, your wishes can still be carried out. The plan is a series of legal documents that note a persons wishes. The documents vary by state. Advance care planning may be done when a person has a serious illness that is expected to get worse. It may be done before major surgery. And it can help you and your family be prepared in case of a major illness or injury. Advance care planning helps with making decisions at these times.       A health care proxy is a person who acts as the voice of a patient when the patient cant speak for himself or herself. The name of this role varies by state. It may be called a Durable Medical Power of  or Durable Power of  for Healthcare. It may be called an agent, surrogate, or advocate. Or it may be called a representative or decision maker. It is an official duty that is identified by a legal document. The document also varies by state.    Why Is Advance Care Planning Important?  If a person communicates their healthcare wishes:    They will be given medical care that matches their values and goals.    Their family members will not be forced to make decisions in a crisis with no guidance.  Creating a Plan  Making an advance care plan is often done in 3 steps:    Thinking about ones wishes. To create an advance care plan, you should think about what kind of medical treatment you would want if you lose the ability to communicate. Are there any situations in which you would refuse or stop treatment? Are there therapies you would want or not want? And whom do you want to make decisions for you? There are many places to learn more about how to plan for  your care. Ask your doctor or  for resources.    Picking a health care proxy. This means choosing a trusted person to speak for you only when you cant speak for yourself. When you cannot make medical decisions, your proxy makes sure the instructions in your advance care plan are followed. A proxy does not make decisions based on his or her own opinions. They must put aside those opinions and values if needed, and carry out your wishes.    Filling out the legal documents. There are several kinds of legal documents for advance care planning. Each one tells health care providers your wishes. The documents may vary by state. They must be signed and may need to be witnessed or notarized. You can cancel or change them whenever you wish. Depending on your state, the documents may include a Healthcare Proxy form, Living Will, Durable Medical Power of , Advance Directive, or others.  The Familys Role  The best help a family can give is to support their loved ones wishes. Open and honest communication is vital. Family should express any concerns they have about the patients choices while the patient can still make decisions.    8821-4764 [employer]. [address] [place] [address]. All rights reserved. This information is not intended as a substitute for professional medical care. Always follow your healthcare professional's instructions.         Also, [facility] offers a free, downloadable health care directive that allows you to share your treatment choices and personal preferences if you cannot communicate your wishes. It also allows you to appoint another person (called a health care agent) to make health care decisions if you are unable to do so. You can download an advance directive by going here: [email or web address]     Patient Education   Personalized Prevention Plan  You are due for the preventive services outlined below.  Your  care team is available to assist you in scheduling these services.  If you have already completed any of these items, please share that information with your care team to update in your medical record.  Health Maintenance   Topic Date Due   ? HEPATITIS C SCREENING  1955   ? COLONOSCOPY  05/30/2003   ? DXA SCAN  05/30/2018   ? DEPRESSION FOLLOW UP  05/06/2019   ? INFLUENZA VACCINE RULE BASED (1) 08/01/2019   ? MAMMOGRAM  10/17/2019   ? MEDICARE ANNUAL WELLNESS VISIT  11/06/2019   ? FALL RISK ASSESSMENT  11/06/2019   ? PNEUMOCOCCAL IMMUNIZATION 65+ LOW/MEDIUM RISK (2 of 2 - PPSV23) 11/06/2019   ? ADVANCE CARE PLANNING  11/06/2023   ? TD 18+ HE  09/14/2029   ? ZOSTER VACCINES  Completed

## 2021-06-19 NOTE — LETTER
Letter by Saman Matamoros DO at      Author: Saman Matamoros DO Service: -- Author Type: --    Filed:  Encounter Date: 10/8/2019 Status: Signed         Candance M Brown  [address] [place] [address]             October 8, 2019         Dear Ms. Rodriguez,    Below are the results from your recent visit:    Resulted Orders   Hepatitis C Antibody (Anti-HCV)   Result Value Ref Range    Hepatitis C Ab Negative Negative   Comprehensive Metabolic Panel   Result Value Ref Range    Sodium 139 136 - 145 mmol/L    Potassium 4.4 3.5 - 5.0 mmol/L    Chloride 105 98 - 107 mmol/L    CO2 25 22 - 31 mmol/L    Anion Gap, Calculation 9 5 - 18 mmol/L    Glucose 82 70 - 125 mg/dL    BUN 13 8 - 22 mg/dL    Creatinine 0.95 0.60 - 1.10 mg/dL    GFR MDRD Af Amer >60 >60 mL/min/1.73m2    GFR MDRD Non Af Amer 59 (L) >60 mL/min/1.73m2    Bilirubin, Total 0.3 0.0 - 1.0 mg/dL    Calcium 9.3 8.5 - 10.5 mg/dL    Protein, Total 6.5 6.0 - 8.0 g/dL    Albumin 3.7 3.5 - 5.0 g/dL    Alkaline Phosphatase 102 45 - 120 U/L    AST 14 0 - 40 U/L    ALT 21 0 - 45 U/L    Narrative    Fasting Glucose reference range is 70-99 mg/dL per  American Diabetes Association (ADA) guidelines.   Lipid Cascade   Result Value Ref Range    Cholesterol 222 (H) <=199 mg/dL    Triglycerides 167 (H) <=149 mg/dL    HDL Cholesterol 46 (L) >=50 mg/dL    LDL Calculated 143 (H) <=129 mg/dL    Patient Fasting > 8hrs? Yes    Vitamin D, Total (25-Hydroxy)   Result Value Ref Range    Vitamin D, Total (25-Hydroxy) 51.0 30.0 - 80.0 ng/mL    Narrative    Deficiency <10.0 ng/mL  Insufficiency 10.0-29.9 ng/mL  Sufficiency 30.0-80.0 ng/mL  Toxicity (possible) >100.0 ng/mL       The laboratory tests show that your cholesterol has come down since the last check, but it is still mildly elevated.  Keep working on getting regular exercise and eating healthy foods.  Your GFR (kidney filtration rate) is slightly impaired.  We will continue to monitor this at future  appointments.  I recommend that you avoid medications like Advil or Aleve.  The rest of your lab results look great!    Please call with questions or contact us using [employer].    Sincerely,        Electronically signed by Saman Anders, DO

## 2021-06-19 NOTE — LETTER
Letter by Saman Matamoros DO at      Author: Saman Matamoros DO Service: -- Author Type: --    Filed:  Encounter Date: 10/21/2019 Status: Signed         Candance M Brown  [address] [place] [address]             October 21, 2019         Dear Ms. Rodriguez,    Below are the results from your recent visit:    Resulted Orders   DXA Bone Density Scan    Narrative    10/18/2019      RE: Candance Brown  YOB: 1955        Dear Saman Matamoros,    Patient Profile:  64 y.o. female, postmenopausal, is here for the first bone density test at   this site.   History of fractures - None. Family history of osteoporosis - None.    Family history of hip fracture: None. Smoking history - No. Osteoporosis   treatment past -  Yes;  HRT. Osteoporosis treatment current - No.  Chronic   medical problems - Breast cancer, Chronic low back problems, Malignancy   and Radiation treatment. High risk medications -  None.    Assessment:    1. The spine bone density is best assessed using L1-L2 with T-score of   -2.4..  2. Femoral bone densities show left femoral neck T- score of -2.4, and   right total hip T-score of -1.5..  3.  Her previous scan is not available for comparison.  4.  The trabecular bone score could not be assessed    64 y.o. female with LOW BONE DENSITY (OSTEOPENIA) and MODERATE predicted   fracture risk with a 10-year major osteoporotic fracture risk of 12.4 %   and hip fracture risk of 2.6 %.    Recommendations:  Ensure adequate calcium, vitamin D intake, and regular weightbearing   exercise with a recheck in 2 years.  Further preventative intervention   also would be reasonable..      Bone densitometry was performed on your patient using our [employer]   densitometer. The results are summarized and a copy of the actual scans   are included for your review. In conformity with the International Society   of Clinical Densitometry's most recent position statement for  DXA   interpretation (2015), the diagnosis will be made on the lowest measured   T-score of the lumbar spine, femoral neck, total proximal femur or 33%   radius. Note the change in terminology for diagnostic classification from   OSTEOPENIA to LOW BONE MASS. All trending for sequential exams will be   done using multiple vertebrae or the total proximal femur. Fracture risk   is based on the WHO Fracture Risk Assessment Tool (FRAX). If additional   information is needed or if you would like to discuss the results, please   do not hesitate to call me.       Thank you for referring this patient to [facility].   We are happy to be of service in support of you and your practice. If you   have any questions or suggestions to improve our service, please call me   at [phone].     Sincerely,     Piyush Serrato M.D. C.C.D.  Osteoporosis Services, [facility]         DEXA scan results show low bone density (osteopenia).  I recommend regular weightbearing exercise and adequate calcium (1200 mg total per day) and vitamin D intake.  We should recheck this in 2 years.    Please call with questions or contact us using [employer].    Sincerely,        Electronically signed by Saman Anders,

## 2021-06-19 NOTE — LETTER
Letter by Saman Matamoros DO at      Author: Saman Matamoros DO Service: -- Author Type: --    Filed:  Encounter Date: 5/13/2019 Status: (Other)         Candance M Brown  [address]  [place] [address]      May 15, 2019      Dear Candance    In reviewing your records, we have determined a gap in your preventive services. Based on your age and health history, we recommend the follow service.     ? General Physical  ? Physical with a Pap Smear  ? Colon cancer screening  ? Mammogram  ? Immunization  ? Diabetic check  ? Blood pressure/cardiovascular check  ? Asthma check  ? Cholesterol test  ? Lab work  ? Med check      If you have had the service elsewhere, please contact us so we can update our records. Please let us know if you have transferred your care to another clinic.    Please call [phone] to schedule this appointment.    We believe that a strong preventive care program, including regular physicals and follow-up care is an important part of a healthy lifestyle and we are committed to helping you maintain your health.    Thank you for choosing us as your health care provider.    Sincerely,     [facility]

## 2021-06-20 NOTE — LETTER
Letter by Yoko Maradiaga RN at      Author: Yoko Maradiaga RN Service: -- Author Type: --    Filed:  Encounter Date: 12/19/2019 Status: Signed       12/19/2019    Candance M Brown  [address] [place] [address]    Re: Recent Colonoscopy    Dear Candance M Brown.    We are writing with results from your recent colonoscopy which showed:     Two polyps in the sigmoid colon. With these findings, I recommend a repeat colonoscopy in three years. To ensure better visualization of the colon during the procedure, I would also recommend a two day bowel prep.     Polyps are growths from the lining of the colon. Some polyps have potential to progress on to become colon cancers. When polyps are identified during a colonoscopy we do our best to remove the entire polyp and send it to pathology for an evaluation under the microscope. The risk for developing colon cancer from a polyp is drastically reduced when that polyp is removed.       If any new concerns arise in the meantime, please contact your primary care provider for evaluation so your provider can help you decide if you need a colonscopy sooner. Some things to watch for include blood in your stool, stomach pain or cramping that does not resolve, or unexplained weight loss.    We sincerely appreciate the opportunity to provide your care. If you have any questions please feel free to contact us at [phone].    Sincerely,     Heike Ray, DO

## 2021-06-21 NOTE — PROGRESS NOTES
Assessment/Plan:       1. Acute non-recurrent pansinusitis  Patient advised to stop the Augmentin and I switched her to levofloxacin 500 mg once daily for 7 days.  Advised patient that she can stop the levofloxacin on day 5 if she has significant resolution of her symptoms. Otherwise I would like her to complete the entire 7-day prescription.  I discussed side effects of this antibiotic with the patient as well as risks and benefits.  If no improvement following this prescription I would recommend that she follow-up in clinic and at that time I would consider a referral to ENT versus a sinus CT to evaluate symptoms.  Discussed ongoing at home symptomatic management as well with patient.  Patient is in agreement with this plan.  - levoFLOXacin (LEVAQUIN) 500 MG tablet; Take 1 tablet (500 mg total) by mouth daily for 7 days.  Dispense: 7 tablet; Refill: 0        Subjective:      Candance M Brown is a 63 y.o. female who presents for follow up of sinusitis. She was seen on 10/10 for sinusitis and was given a prescription of Augmentin.  She has taken this as prescribed and continues to have significant symptoms of sinusitis including facial pain above, behind and below her eyes.  Pain radiating into her teeth.  She also has increased head pressure with coughing.  She is feeling significant postnasal drainage.  No specific tenderness in her ears or around her ears is present.  She denies any specific fevers.  She reports that symptoms have been persistent and have not improved despite antibiotic treatment.  She denies any chest pain or shortness of breath.  She has tolerated the Augmentin well and is without complaint of diarrhea.    The following portions of the patient's history were reviewed and updated as appropriate: allergies, current medications and problem list.    Patient Active Problem List   Diagnosis     Vitamin D Deficiency     Severe Recurrent Major Depression     Anxiety Disorder NOS     Panic Disorder  "Without Agoraphobia     Esophageal Reflux     Right hip pain     Trigger Finger (Acquired)     History of breast cancer     Hyperlipemia     Mood disorder (H)     Cognitive disorder     Personality disorder (H)     Low back pain     Seborrheic keratoses       Current Outpatient Prescriptions:      amoxicillin-clavulanate (AUGMENTIN) 875-125 mg per tablet, Take 1 tablet by mouth 2 (two) times a day for 10 days., Disp: 20 tablet, Rfl: 0     fluticasone (FLONASE ALLERGY RELIEF) 50 mcg/actuation nasal spray, 1 spray into each nostril daily., Disp: 16 g, Rfl: 2     ibuprofen (ADVIL,MOTRIN) 600 MG tablet, TAKE 1 TABLET BY MOUTH EVERY 6 HOURS AS NEEDED FOR PAIN., Disp: 30 tablet, Rfl: 0     loratadine (CLARITIN) 10 mg tablet, Take 1 tablet (10 mg total) by mouth daily., Disp: 90 tablet, Rfl: 3     ranitidine (ZANTAC) 150 MG tablet, TAKE 1 TABLET BY MOUTH 2 TIMES A DAY., Disp: 180 tablet, Rfl: 0     venlafaxine (EFFEXOR-XR) 75 MG 24 hr capsule, Take 1 capsule (75 mg total) by mouth daily., Disp: 90 capsule, Rfl: 3     VITAMIN D3 2,000 unit capsule, TAKE 1 CAPSULE BY MOUTH DAILY, Disp: 90 capsule, Rfl: 0     levoFLOXacin (LEVAQUIN) 500 MG tablet, Take 1 tablet (500 mg total) by mouth daily for 7 days., Disp: 7 tablet, Rfl: 0    Review of Systems   A 12 point comprehensive review of systems was negative except as noted.      Objective:      /82  Pulse 72  Resp 16  Ht 5' 4.5\" (1.638 m)  Wt 138 lb (62.6 kg)  BMI 23.32 kg/m2    General Appearance: Alert, cooperative, appears uncomfortable and slightly fatigued.  Head: Normocephalic, without obvious abnormality, atraumatic.  Eyes: PERRL, conjunctiva/corneas clear, EOM's intact.  Ears: Normal TM's and external ear canals, both ears.  Nose: Nares congested.  Mucosa is erythematous turbinates are swollen.  Throat: Slightly erythematous. No exudates.  Neck: Supple, symmetrical, trachea midline, no adenopathy.  Heart : normal rate, regular rhythm, normal S1, S2, no murmurs, " rubs, clicks or gallops.  Lungs: Clear to auscultation bilaterally, respirations unlabored.  Extremities: Extremities normal, atraumatic, no cyanosis or edema.  Lymph nodes: Cervical, supraclavicular, and axillary nodes normal.

## 2021-06-21 NOTE — PROGRESS NOTES
Assessment and Plan:       1. Encounter for general adult medical examination with abnormal findings  I encouraged her to start exercising again and to continue working on eating a healthy diet.  We are going to check fasting labs this morning.    2. Screen for colon cancer  - Ambulatory referral for Colonoscopy    3. Severe episode of recurrent major depressive disorder (H)  She admits that she has been feeling more depressed.  She attributes this to the rainy and cloudy weather.  She feels confident that symptoms will improve over the next few weeks.  She is not interested in increasing the venlafaxine dose at this time.  She will continue to monitor this closely and let me know if things are not getting better.    4. Anxiety Disorder NOS  Continue venlafaxine XR 75 mg daily    5. Panic disorder without agoraphobia  Stable on the venlafaxine    6. Mood disorder (H)  Stable on the venlafaxine    7. Personality disorder (H)  Stable on the venlafaxine    8. Trigger Finger (Acquired)  He is given a referral to see an orthopedic hand surgeon to discuss treatment options.  - Ambulatory referral to Orthopedic Surgery    9. Esophageal reflux  Continue ranitidine as needed.    10. Hyperlipemia  - Lipid Cascade  - Comprehensive Metabolic Panel    11. Cognitive disorder  She has an appointment to see neurology in January.    12. Family history of Alzheimer's disease    13. Low back pain  She will continue to exercise on a regular basis.    14. Gastroesophageal reflux disease without esophagitis  - ranitidine (ZANTAC) 150 MG tablet; Take 1 tablet (150 mg total) by mouth 2 (two) times a day.  Dispense: 180 tablet; Refill: 0    15. Vitamin D deficiency  Continue to take the vitamin D supplement.  - Vitamin D, Total (25-Hydroxy)    16. Headache  Recommended she add back Flonase to see if this helps    The patient's current medical problems were reviewed.    The following are part of a depression follow up plan for the patient:   mental health care assessment  The following health maintenance schedule was reviewed with the patient and provided in printed form in the after visit summary:   Health Maintenance   Topic Date Due     COLONOSCOPY  05/30/2003     DEPRESSION FOLLOW UP  04/30/2018     DXA SCAN  05/30/2018     PNEUMOCOCCAL CONJUGATE VACCINE FOR ADULTS (PCV13 OR PREVNAR)  05/30/2018     FALL RISK ASSESSMENT  05/30/2018     INFLUENZA VACCINE RULE BASED (1) 08/01/2018     MAMMOGRAM  10/17/2019     TD 18+ HE  09/27/2020     ADVANCE DIRECTIVES DISCUSSED WITH PATIENT  10/12/2020     PNEUMOCOCCAL POLYSACCHARIDE VACCINE AGE 65 AND OVER  Completed     TDAP ADULT ONE TIME DOSE  Completed     ZOSTER VACCINE  Completed        Subjective:   Chief Complaint: Candance M Brown is an 63 y.o. female here for an Annual Wellness visit.     HPI: She has a history of anxiety, depression, panic episodes, hyperlipidemia, GERD, chronic low back pain, trigger fingers and vitamin D deficiency.  She denies having concerns regarding urination, bowel movements, sleep or mood.  She is currently on venlafaxine 75 mg daily which is working well for her.  However, she admits that she has been feeling more depressed over the past several days due to the cloudy and rainy weather.  During this time of year she often stays indoors and does not get as much exercise.  Once it stops draining she is planning to get out more often and walk with her son.    Overall, she reports that she is feeling healthy.  She continues to get frontal and facial headaches.  She has been off the Flonase.  She takes ranitidine as needed for heartburn which has been helpful.  She is concerned about ongoing issues with trigger fingers in both hands.  She would like to see an orthopedic surgeon to talk about treatment options.     Social history: Single.    Review of Systems:   Please see above.  The rest of the review of systems are negative for all systems.    Patient Care Team:  Saman Lopez  Chey Anders DO as PCP - General     Patient Active Problem List   Diagnosis     Vitamin D Deficiency     Severe Recurrent Major Depression     Anxiety Disorder NOS     Panic Disorder Without Agoraphobia     Esophageal Reflux     Right hip pain     Trigger Finger (Acquired)     History of breast cancer     Hyperlipemia     Mood disorder (H)     Cognitive disorder     Personality disorder (H)     Low back pain     Seborrheic keratoses     Past Medical History:   Diagnosis Date     Breast cancer (H) 2003    left     Hx of radiation therapy       Past Surgical History:   Procedure Laterality Date     BREAST LUMPECTOMY Left 2003      Family History   Problem Relation Age of Onset     Dementia Mother      Dementia Maternal Aunt      Dementia Maternal Grandmother       Social History     Social History     Marital status:      Spouse name: N/A     Number of children: N/A     Years of education: N/A     Occupational History     Not on file.     Social History Main Topics     Smoking status: Former Smoker     Smokeless tobacco: Never Used      Comment: Ecig sometimes-rarely     Alcohol use No     Drug use: No     Sexual activity: Not on file     Other Topics Concern     Not on file     Social History Narrative      Current Outpatient Prescriptions   Medication Sig Dispense Refill     ibuprofen (ADVIL,MOTRIN) 600 MG tablet TAKE 1 TABLET BY MOUTH EVERY 6 HOURS AS NEEDED FOR PAIN. 30 tablet 0     loratadine (CLARITIN) 10 mg tablet Take 1 tablet (10 mg total) by mouth daily. 90 tablet 3     ranitidine (ZANTAC) 150 MG tablet TAKE 1 TABLET BY MOUTH 2 TIMES A DAY. 180 tablet 0     venlafaxine (EFFEXOR-XR) 75 MG 24 hr capsule Take 1 capsule (75 mg total) by mouth daily. 90 capsule 3     VITAMIN D3 2,000 unit capsule TAKE 1 CAPSULE BY MOUTH DAILY 90 capsule 0     fluticasone (FLONASE ALLERGY RELIEF) 50 mcg/actuation nasal spray 1 spray into each nostril daily. 16 g 2     No current facility-administered medications for  "this visit.       Objective:   Vital Signs:   Visit Vitals     /60 (Patient Site: Right Arm, Patient Position: Sitting, Cuff Size: Adult Regular)     Pulse 68     Temp 98.6  F (37  C) (Oral)     Resp 16     Ht 5' 4.5\" (1.638 m)     Wt 139 lb 2 oz (63.1 kg)     BMI 23.51 kg/m2        VisionScreening:  No exam data present     PHYSICAL EXAM  General Appearance: Alert, cooperative, no distress, appears stated age  Head: Normocephalic, without obvious abnormality, atraumatic.  Sinuses mildly tender to palpation  Eyes: PERRL, conjunctiva/corneas clear, EOM's intact  Ears: Normal TM's and external ear canals, both ears  Nose: Nares normal, septum midline,mucosa normal, no drainage  Throat: Lips, mucosa, and tongue normal; teeth and gums normal  Neck: Supple, symmetrical, trachea midline, no adenopathy;  thyroid: not enlarged, symmetric, no tenderness/mass/nodules  Back: Symmetric, no curvature, ROM normal, no CVA tenderness  Lungs: Clear to auscultation bilaterally, respirations unlabored  Breasts: No breast masses, tenderness, asymmetry, or nipple discharge.  Heart: Regular rate and rhythm, S1 and S2 normal, no murmur, rub, or gallop   Abdomen: Soft, non-tender, bowel sounds active all four quadrants,  no masses, no organomegaly  Pelvic:Not examined  Extremities: Extremities atraumatic, no cyanosis or edema.  There are a few small nodules just proximal to the right and left third and fourth fingers.  Skin: Skin color, texture, turgor normal, no rashes or lesions  Lymph nodes: Cervical, supraclavicular, and axillary nodes normal  Neurologic: Alert.  Normal speech.  No focal deficits.  Deep tendon reflexes normal bilaterally  Psych: Appears moderately anxious and depressed.  PHQ-9 score is 18.  She denies any suicidal ideations.  FRANK-7 score is 12.      No flowsheet data found.  A Mini-Cog score of 0-2 suggests the possibility of dementia, score of 3-5 suggests no dementia    Identified Health Risks:     The " patient's PHQ-9 score is consistent with severe depression.  She was provided with information regarding depression and suicide prevention and was advised to schedule a follow up appointment in 3 months to further address this issue.

## 2021-06-22 NOTE — TELEPHONE ENCOUNTER
Who is calling:   Patient  Reason for Call:   90 day supply with refills  New Pharmacy for patient  Date of last appointment with primary care:   11/6/2018  Has the patient been recently seen:  Yes  Okay to leave a detailed message: No

## 2021-06-22 NOTE — TELEPHONE ENCOUNTER
Refill Approved (zantac, effexor and vit d)     Rx renewed per Medication Renewal Policy. Medication was last renewed on 11/6/18 and 10/10/18 -resent to requesting mail order     [facility] Triage/Med Refill 1/5/2019     Requested Prescriptions   Pending Prescriptions Disp Refills     venlafaxine (EFFEXOR-XR) 75 MG 24 hr capsule 90 capsule 3     Sig: Take 1 capsule (75 mg total) by mouth daily.    Venlafaxine/Desvenlafaxine Refill Protocol Passed - 1/4/2019 11:12 AM       Passed - LFT or AST or ALT in last year    Albumin   Date Value Ref Range Status   11/06/2018 3.8 3.5 - 5.0 g/dL Final     Bilirubin, Total   Date Value Ref Range Status   11/06/2018 0.5 0.0 - 1.0 mg/dL Final     Alkaline Phosphatase   Date Value Ref Range Status   11/06/2018 115 45 - 120 U/L Final     AST   Date Value Ref Range Status   11/06/2018 17 0 - 40 U/L Final     ALT   Date Value Ref Range Status   11/06/2018 23 0 - 45 U/L Final     Protein, Total   Date Value Ref Range Status   11/06/2018 6.8 6.0 - 8.0 g/dL Final               Passed - Fasting lipid cascade in last year    Cholesterol   Date Value Ref Range Status   11/06/2018 246 (H) <=199 mg/dL Final     Triglycerides   Date Value Ref Range Status   11/06/2018 174 (H) <=149 mg/dL Final     HDL Cholesterol   Date Value Ref Range Status   11/06/2018 50 >=50 mg/dL Final     LDL Calculated   Date Value Ref Range Status   11/06/2018 161 (H) <=129 mg/dL Final     Patient Fasting > 8hrs?   Date Value Ref Range Status   11/06/2018 Yes  Final            Passed - PCP or prescribing provider visit in last year    Last office visit with prescriber/PCP: 10/10/2018 Saman Matamoros DO OR same dept: 10/18/2018 Zully Mixon CNP OR same specialty: 10/18/2018 Zully Mixon CNP  Last physical: 11/6/2018 Last MTM visit: Visit date not found   Next visit within 3 mo: Visit date not found  Next physical within 3 mo: Visit date not found  Prescriber OR PCP:  Saman Anders DO  Last diagnosis associated with med order: 1. Gastroesophageal reflux disease without esophagitis  - ranitidine (ZANTAC) 150 MG tablet; Take 1 tablet (150 mg total) by mouth 2 (two) times a day.  Dispense: 180 tablet; Refill: 0    2. Pain  - ibuprofen (ADVIL,MOTRIN) 600 MG tablet; Take 1 tablet (600 mg total) by mouth every 6 (six) hours as needed for pain.  Dispense: 30 tablet; Refill: 0    3. Vitamin D deficiency disease  - cholecalciferol, vitamin D3, (VITAMIN D3) 2,000 unit capsule; Take 1 capsule (2,000 Units total) by mouth daily.  Dispense: 90 capsule; Refill: 0    If protocol passes may refill for 12 months if within 3 months of last provider visit (or a total of 15 months).            Passed - Blood Pressure in last year    BP Readings from Last 1 Encounters:   11/06/18 102/60             ranitidine (ZANTAC) 150 MG tablet 180 tablet 0     Sig: Take 1 tablet (150 mg total) by mouth 2 (two) times a day.    GI Medications Refill Protocol Passed - 1/4/2019 11:12 AM       Passed - PCP or prescribing provider visit in last 12 or next 3 months.    Last office visit with prescriber/PCP: 10/10/2018 Saman Matamoros DO OR same dept: 10/18/2018 Zully Mixon CNP OR same specialty: 10/18/2018 Zully Mixon CNP  Last physical: 11/6/2018 Last MTM visit: Visit date not found   Next visit within 3 mo: Visit date not found  Next physical within 3 mo: Visit date not found  Prescriber OR PCP: Saman Anders DO  Last diagnosis associated with med order: 1. Gastroesophageal reflux disease without esophagitis  - ranitidine (ZANTAC) 150 MG tablet; Take 1 tablet (150 mg total) by mouth 2 (two) times a day.  Dispense: 180 tablet; Refill: 0    2. Pain  - ibuprofen (ADVIL,MOTRIN) 600 MG tablet; Take 1 tablet (600 mg total) by mouth every 6 (six) hours as needed for pain.  Dispense: 30 tablet; Refill: 0    3. Vitamin D deficiency disease  - cholecalciferol,  vitamin D3, (VITAMIN D3) 2,000 unit capsule; Take 1 capsule (2,000 Units total) by mouth daily.  Dispense: 90 capsule; Refill: 0    If protocol passes may refill for 12 months if within 3 months of last provider visit (or a total of 15 months).             ibuprofen (ADVIL,MOTRIN) 600 MG tablet 30 tablet 0     Sig: Take 1 tablet (600 mg total) by mouth every 6 (six) hours as needed for pain.    There is no refill protocol information for this order        cholecalciferol, vitamin D3, (VITAMIN D3) 2,000 unit capsule 90 capsule 0     Sig: Take 1 capsule (2,000 Units total) by mouth daily.    There is no refill protocol information for this order

## 2021-06-22 NOTE — TELEPHONE ENCOUNTER
RN cannot approve Refill Request    RN can NOT refill this medication med is not covered by policy/route to provider. Last office visit: 10/10/2018 Saman Matamoros, DO Last Physical: 11/6/2018 Last MTM visit: Visit date not found Last visit same specialty: 10/18/2018 Zully Mixon, CNP.  Next visit within 3 mo: Visit date not found  Next physical within 3 mo: Visit date not found      [facility] Triage/Med Refill 1/5/2019    Requested Prescriptions   Pending Prescriptions Disp Refills     ibuprofen (ADVIL,MOTRIN) 600 MG tablet 30 tablet 0     Sig: Take 1 tablet (600 mg total) by mouth every 6 (six) hours as needed for pain.    There is no refill protocol information for this order      Signed Prescriptions Disp Refills     venlafaxine (EFFEXOR-XR) 75 MG 24 hr capsule 90 capsule 3     Sig: Take 1 capsule (75 mg total) by mouth daily.    Venlafaxine/Desvenlafaxine Refill Protocol Passed - 1/4/2019 11:12 AM       Passed - LFT or AST or ALT in last year    Albumin   Date Value Ref Range Status   11/06/2018 3.8 3.5 - 5.0 g/dL Final     Bilirubin, Total   Date Value Ref Range Status   11/06/2018 0.5 0.0 - 1.0 mg/dL Final     Alkaline Phosphatase   Date Value Ref Range Status   11/06/2018 115 45 - 120 U/L Final     AST   Date Value Ref Range Status   11/06/2018 17 0 - 40 U/L Final     ALT   Date Value Ref Range Status   11/06/2018 23 0 - 45 U/L Final     Protein, Total   Date Value Ref Range Status   11/06/2018 6.8 6.0 - 8.0 g/dL Final               Passed - Fasting lipid cascade in last year    Cholesterol   Date Value Ref Range Status   11/06/2018 246 (H) <=199 mg/dL Final     Triglycerides   Date Value Ref Range Status   11/06/2018 174 (H) <=149 mg/dL Final     HDL Cholesterol   Date Value Ref Range Status   11/06/2018 50 >=50 mg/dL Final     LDL Calculated   Date Value Ref Range Status   11/06/2018 161 (H) <=129 mg/dL Final     Patient Fasting > 8hrs?   Date Value Ref Range  Status   11/06/2018 Yes  Final            Passed - PCP or prescribing provider visit in last year    Last office visit with prescriber/PCP: 10/10/2018 Saman Matamoros DO OR same dept: 10/18/2018 Zully Mixon CNP OR same specialty: 10/18/2018 Zully Mixon CNP  Last physical: 11/6/2018 Last MTM visit: Visit date not found   Next visit within 3 mo: Visit date not found  Next physical within 3 mo: Visit date not found  Prescriber OR PCP: Saman Anders DO  Last diagnosis associated with med order: 1. Gastroesophageal reflux disease without esophagitis  - ranitidine (ZANTAC) 150 MG tablet; Take 1 tablet (150 mg total) by mouth 2 (two) times a day.  Dispense: 180 tablet; Refill: 3    2. Pain  - ibuprofen (ADVIL,MOTRIN) 600 MG tablet; Take 1 tablet (600 mg total) by mouth every 6 (six) hours as needed for pain.  Dispense: 30 tablet; Refill: 0    3. Vitamin D deficiency disease  - cholecalciferol, vitamin D3, (VITAMIN D3) 2,000 unit capsule; Take 1 capsule (2,000 Units total) by mouth daily.  Dispense: 90 capsule; Refill: 3    4. Depression  - venlafaxine (EFFEXOR-XR) 75 MG 24 hr capsule; Take 1 capsule (75 mg total) by mouth daily.  Dispense: 90 capsule; Refill: 3    If protocol passes may refill for 12 months if within 3 months of last provider visit (or a total of 15 months).            Passed - Blood Pressure in last year    BP Readings from Last 1 Encounters:   11/06/18 102/60             ranitidine (ZANTAC) 150 MG tablet 180 tablet 3     Sig: Take 1 tablet (150 mg total) by mouth 2 (two) times a day.    GI Medications Refill Protocol Passed - 1/4/2019 11:12 AM       Passed - PCP or prescribing provider visit in last 12 or next 3 months.    Last office visit with prescriber/PCP: 10/10/2018 Saman Matamoros DO OR same dept: 10/18/2018 Zully Mixon CNP OR same specialty: 10/18/2018 Zully Mixon CNP  Last physical: 11/6/2018 Last MTM visit: Visit  date not found   Next visit within 3 mo: Visit date not found  Next physical within 3 mo: Visit date not found  Prescriber OR PCP: Saman Anders DO  Last diagnosis associated with med order: 1. Gastroesophageal reflux disease without esophagitis  - ranitidine (ZANTAC) 150 MG tablet; Take 1 tablet (150 mg total) by mouth 2 (two) times a day.  Dispense: 180 tablet; Refill: 3    2. Pain  - ibuprofen (ADVIL,MOTRIN) 600 MG tablet; Take 1 tablet (600 mg total) by mouth every 6 (six) hours as needed for pain.  Dispense: 30 tablet; Refill: 0    3. Vitamin D deficiency disease  - cholecalciferol, vitamin D3, (VITAMIN D3) 2,000 unit capsule; Take 1 capsule (2,000 Units total) by mouth daily.  Dispense: 90 capsule; Refill: 3    4. Depression  - venlafaxine (EFFEXOR-XR) 75 MG 24 hr capsule; Take 1 capsule (75 mg total) by mouth daily.  Dispense: 90 capsule; Refill: 3    If protocol passes may refill for 12 months if within 3 months of last provider visit (or a total of 15 months).             cholecalciferol, vitamin D3, (VITAMIN D3) 2,000 unit capsule 90 capsule 3     Sig: Take 1 capsule (2,000 Units total) by mouth daily.    There is no refill protocol information for this order

## 2021-06-22 NOTE — TELEPHONE ENCOUNTER
Medication Question or Clarification  Who is calling: Patient  What medication are you calling about?: ibuprofen   What dose do you take?: 600 mg  How often are you taking the medication?: every 6 hours as needed for pain   Who prescribed the medication?: Dr Chey Anders   What is your question/concern?: The patient states this prescription was suppose to be for a 90 day quantity.   Pharmacy: [facility]  Okay to leave a detailed message?: Yes  Site CMT - Please call the pharmacy to obtain any additional needed information.

## 2021-06-23 NOTE — TELEPHONE ENCOUNTER
Name of form/paperwork: Other:  Continuing Disability Report  Have you been seen for this request: N/A    Do we have the form: Yes- in green folder in DE's bin  When is form needed by: N/A  How would you like the form returned: Mailed, attached return mail envelope to forms  Fax Number: N/A  Patient Notified form requests are processed in 3-5 business days: No N/A  (If patient needs form sooner, please note that in this message.)  Okay to leave a detailed message? No N/A

## 2021-06-23 NOTE — PROGRESS NOTES
The patient was seen for a neuropsychological evaluation for the purposes of diagnostic clarification and treatment planning. 190 minutes of face-to-face testing were provided by this writer. An additional 150 minutes were spent scoring and compiling test results.The patient was cooperative with testing. No concerns were brought to my attention. Please see Dr. Rinaldi's report for a detailed description of the charges and interpretation and integration of the findings.

## 2021-06-23 NOTE — PROGRESS NOTES
"       NEUROPSYCHOLOGICAL CONSULTATION    NAME:  Candance M Brown  :  1955    SMITH: 2019    REASON FOR REFERRAL:  Ms. Rodriguez is a 63 y.o., right-handed,  female with a history of depression and panic disorder without agoraphobia, as well as a family history of Alzheimer's disease, who was referred for a neurocognitive evaluation by Martina Salgado MD (Neurology) to assist with differential diagnosis and care planning. She arrived to today's appointment unaccompanied and provided the details of her history as described below.    CLINICAL INTERVIEW:  Ms. Rodriguez started the interview by stating that anxiety affects her cognition.  She feels that her cognitive symptoms are \"solely triggered by stress.\"  She went on to describe a period of time approximately 4-5 years ago when she was extremely overwhelmed while care giving for her aging mother and facing a hostile work environment. It was at that time that she first began struggling with her short-term memory and focus.  She described an extremely stressful work environment, where she felt that her employer was attempting to \"manufacture\" performance issues. She was getting written up and reprimanded on a regular basis.  She said that experience \"made her feel crazy,\" and that she began doubting her knowledge in her field of expertise.  She ultimately ended up getting laid off and retiring at the age of 60.  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 She denied difficulties remembering conversations. When not under significant stress, her recall for details is also quite strong.  She makes minor oversights in daily life, including misplacing her keys or locking them in her car (occurred twice in the last year). She uses calendars to track her schedule. About once a day she feels like she experiences the tip of the tongue phenomenon and will search for her words.  " She typically enjoys reading but struggles to recall the story line if reading under stress.  When that happens, she will read the information over and over but cannot focus or digest the material.  The same is true if she is watching television when under stress.  She continues to describe quick speed of processing, but notes that she can no longer juggle the same number of balls in the air as she would have several years ago.  While these symptoms are most prominent when she is under stress, they are not present to some degree at all times and she is concerned about the possible emergence of a neurodegenerative condition.    With regard to the activities of daily living, Ms. Rodriguez reported that she currently lives in her family home and is independent in the management of her medications, finances and personal affairs.  She keeps up her household adequately and engages in meal preparation without difficulty.  She continues to drive and denied a history of getting lost or turned around. She has always had difficulties with left right orientation.    MEDICAL HISTORY:  Ms. Rodriguez's medical history is significant for:  Past Medical History:   Diagnosis Date     Breast cancer (H) 2003    left     Hx of radiation therapy    In addition to the aforementioned conditions, she has a history of seborrheic keratosis, esophageal reflux, hyperlipidemia, vitamin D deficiency, and an essential tremor of her neck.  She stated that she began suffering from migraine headaches approximately 3 years ago due to an abscessed tooth, but her headaches abated once that condition was treated.  There is a history of head injury with momentary loss of consciousness when she fell from her attic.  She shattered her arm and hit the back of her head on the ground.  She sought treatment in the emergency room but was never evaluated or diagnosed with a concussion and denied any postconcussive symptoms.  She does experience some mild chronic pain  secondary to arthritis in her fingers as well as right hip pain.  She denied any issues with walking or falling but does report some intermittent episodes of imbalance.    Diagnostic studies:  No brain imaging on file in [facility] medical records.     Past Surgical History:   Procedure Laterality Date     BREAST LUMPECTOMY Left      Current medications include (per medical record):   Current Outpatient Medications:      cholecalciferol, vitamin D3, (VITAMIN D3) 2,000 unit capsule, Take 1 capsule (2,000 Units total) by mouth daily., Disp: 90 capsule, Rfl: 3     fluticasone (FLONASE ALLERGY RELIEF) 50 mcg/actuation nasal spray, 1 spray into each nostril daily., Disp: 16 g, Rfl: 2     ibuprofen (ADVIL,MOTRIN) 600 MG tablet, Take 1 tablet (600 mg total) by mouth every 6 (six) hours as needed for pain., Disp: 180 tablet, Rfl: 2     loratadine (CLARITIN) 10 mg tablet, Take 1 tablet (10 mg total) by mouth daily., Disp: 90 tablet, Rfl: 3     ranitidine (ZANTAC) 150 MG tablet, Take 1 tablet (150 mg total) by mouth 2 (two) times a day., Disp: 180 tablet, Rfl: 3     venlafaxine (EFFEXOR-XR) 75 MG 24 hr capsule, Take 1 capsule (75 mg total) by mouth daily., Disp: 90 capsule, Rfl: 3.    FAMILY MEDICAL HISTORY:   Family medical history is significant for: Dementia (mother, maternal aunt, maternal grandmother, maternal great-grandmother), nervous breakdown (mother), bipolar disorder (nephew), mood disorder, unspecified (sister), absence abuse disorder (3 nephews), mitral valve murmur (sister), hyperthyroidism (mother).  In addition, several members of the patient's family have  prematurely including her father who was crushed in a workplace injury, her sister who was murdered,and her brother who  in a motor vehicle accident.    PSYCHIATRIC HISTORY:  With regard to her psychiatric history, Ms. Rodriguez endorsed a history of past psychiatric conditions and mental health treatment. Specifically, she stated that she did not  grow up with depression or anxiety but began to experience those symptoms in adulthood.  In her 30's, she was psychiatrically hospitalized for stress and exhaustion but denied any past or present suicidal ideation.  At that time, she subsequently worked with a psychologist twice weekly to treat her symptoms of depression anxiety and learned very effective coping strategies.  She did not require additional psychological treatment until approximately 5 years ago, when she was undergoing stress related to caring for her mother and working in the hospital environment.  At that time she began taking Effexor which she  continues to take and began counseling with her .  Apparently, he recommended that she meet with a psychologist. However, she instead focused on her baltazar and Bible study, as well as coping strategies that she learned in previous psychotherapy.      Currently, she stated that her mood is okay but she is quite fragile in the face of stress.  She recognizes that stress affects her cognition significantly.   Currently, she enjoys spending time with family members, reading, and caring for her dog.  Her sleep is adequate and she typically gets 8 hours a night and feels rested in the morning.  She does snore but denied history of sleep apnea or dream enactment behavior.  Her appetite is a little diminished, but she has not lost any weight.  She occasionally feels fatigued in the day and will take cat naps.  She walks for exercise.  Other than pharmacotherapy, she is not currently in any treatment for her mood symptoms.    With regard to substance abuse, Ms. Rodriguez reported no history of past drug or alcohol abuse or dependence.  She is a non-drinker and will occasionally smoke an e-cigarette. There is no significant history of drug use or chemical dependency treatment.    SOCIAL HISTORY:  Ms. Rodriguez described herself as a good student. She denied a history of early learning difficulties, individualized  instruction, or grade repetition.  After graduating from high school, she went on to work as a  and later earned an Associate's degree from the [employer] in X-ray technology.  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  She is  retired.  At the present time, she isdivorced and lives alone. She has two adult sons.      SERVICES:   A clinical interview/neurobehavioral status examination was conducted with the patient and documented. I thoroughly reviewed the medical record, selected the neuropsychological test battery, provided supervision to the trained examiner/technician, interpreted/integrated patient data and test results, engaged in clinical decision making, treatment planning and and report writing/preparation. I directly administered/scored 2+ of the neuropsychological tests. A trained examiner/technician administered and scored the remainder of the neuropsychological tests (2 + tests).  Please see below for a breakdown of time spent and the associated codes billed for these services.  Services   Time Spent  CPT Codes   Neurobehavioral Status Exam:  (e.g., face-to-face, interpretation, report)   62 minutes   1 x 96116   Neuropsychological Evaluation Services:   (e.g., integration, interpretation, report preparation, treatment planning, clinical decision making, feedback)   136 minutes   1 x 96132  1 x 96133   Neuropsychological Testing by Psychologist:  (e.g., test administration, scoring, 2+ tests administered)   20 minutes   1 x 96136     Neuropsychological Testing by Trained Examiner/Technician:  (e.g., test administration, scoring, 2+ tests administered)   340 minutes   1 x 96138  10 x 96139   For diagnostic and coding purposes, Ms. Rodriguez has a history of depression and anxiety and was referred for an evaluation of mild neurocognitive disorder.   Charges for  76127 were entered on today's date. All other charges for these services (12523, 71281, 76893, 57307, 29754) were entered on the last date of service (3/8/19) and which includes an additional charge for the feedback service on that date (see note in EMR for details).    TESTS ADMINISTERED:   Wechsler Adult Intelligence Scale-IV (select subtests), Wide Range Achievement Test-4 (select subtests), Wechsler Memory Test-III and IV (select subtests), Brief Visuospatial Memory Test-Revised, [place] Verbal Learning Test-II, Trailmaking Test, Controlled Oral Word Association Test and Category Fluency, Porter Naming Test-2,Lakhwinder-Osterrieth Complex Figure Test, Stroop Color and Word Test, [place] Card Sorting Test-1 deck, Chairez Judgement of Line Orientation,Finger Tapping Test and Grooved Pegboard, Symptom Checklist-90 Revised.    BEHAVIORAL OBSERVATIONS:   Ms. Rodriguez arrived on time and unaccompanied  to today's appointment. She was appropriately dressed and groomed.  She appeared alert and engaged.  Her mood was euthymic and her affect was appropriately reactive.   Rapport was easily established and eye contact was unremarkable.  She was pleasant and cooperative.  Rate, prosody, and content of speech were grossly normal. She was somewhat loquacious and tangential at times.  She was an excellent historian overall.  There was no evidence of a tony thought disorder; no hallucinations or delusions were apparent.  Judgment and insight appeared fair.     Ms. Rodriguez appeared adequately motivated and engaged easily in the testing component of the evaluation.  Her performance was fully intact on measures of objective effort.    She attempted all tasks presented to her and worked at a quick pace.  She did not appear overly frustrated by difficult or challenging tasks but expressed her dissatisfaction with her performance on select items.  She tended to be somewhat hard on herself during testing, but not to the extent that it  undermined her performance. At times she tended to be a bit careless and impulsive.  She tended to be a bit tangential and self-focused between tasks, but responded adequately to redirection.  No significant barriers to testing were observed and the following is judged to be a valid representation of her current neurocognitive strengths and weaknesses.    OPTIMAL PREMORBID INTELLECT:  Optimal premorbid intellectual abilities were estimated as falling in the average range based on Ms. Rodriguez's educational and occupational histories and performance on tasks least likely to be affected by acquired brain dysfunction (i.e.,  hold tests ).    SUMMARY OF TEST RESULTS:   Sensory testing was not performed, in favor of cognitive tasks.  Fine motor speed, as measured by the finger tapping test, fell in the low average range bilaterally.  Similarly, her performance on a measure of speeded motor dexterity, as measured by the grooved pegboard test also fell in the low average range bilaterally.    Ms. Rodriguez was mostly oriented; however, she did state that it was 2018, that Jero was the predecessor to President Dami, and was inaccurate of her time estimation.      Auditory attention and working memory performances were somewhat variable, ranging from the low end of the average range to the superior range relative to her peers.  Specifically, she was able to immediately recall up to 8 digits presented auditorily (average) and mentally reverse and sequence up to 7 digits presented auditorily (high average to superior).  In contrast, her performance declined to the lower end of the average range on a measure of speeded mental arithmetic that required her to solve word problems that were presented auditorily.    Cognitive speed and processing accuracy performances were solidly average across measures of speeded visual scanning, matching and decoding.  Her performance was average on a speeded measure of visual scanning and numeric  sequencing as well as on a measure of speeded mental flexibility and set shifting.  Ms. Rodriguez tended to be mildly impulsive on timed tests, often attempting to begin prior to the start time.  Speeded word reading, color naming and response inhibition fell in the low average to average ranges.    Comprehension was fully intact.  Her performance fell in the average range across measures of acquired verbal knowledge and verbal abstraction.  Confrontation naming was average, as was her performance across speeded measures of phonemic and semantic verbal fluency.      With regard to visuoperceptual skills, Ms. Rodriguez exhibited average to high average performances across measures of three-dimensional visual construction, visual spatial perception, and nonverbal reasoning and pattern identification.  When asked to copy a complex figure, she employed a piecemeal and disorganized approach to doing so and was notably careless in her approach to reproducing the design.  Her performance fell in the markedly impaired range and she struggled to accurately integrate the details of the design.     With regard to learning and memory, Ms. Rodriguez exhibited average initial acquisition of series of details presented auditorily in a short story format and retained and recalled 100+ percent of the previously learned information over a delay (average).  Her performance was within the average range when she was asked to answer series of yes or no questions regarding the content of the previously learned stories.  She employed an average rate of learning of a 16-item word list over 5 repetitions (raw score recall = 5, 7, 11, 15, 14).  She retained and recalled 14 of the previously learned words following the presentation of a distractor word list (superior) as well as over a 20-minute delay (high average).  Her performance did not improve significantly when she was presented with semantic cues.  She accurately recognized 16 target words and  made 0 false positive errors (superior discriminability).  Ms. Rodriguez's performance fell in the average range on a visual spatial learning task that required her to re-create a series of 6 geometric designs over 3 learning trials.  She retained and recalled 5 of the 8 previously learned details, which fell in the borderline range relative to her peers.  She accurately recognized all 6 target figures when presented with a recognition test format and made 0 false positive errors.      Ms. Rodriguez was also administered a measure of nonverbal problem solving that required her to generate and flexibly alternate between card sorting strategies based on the feedback that she received from the examiner.  She caught on to the task quickly and was flexible and non-perseverative in her approach, achieving an average number of solutions overall.    On a self-report mood measure, Ms. Rodriguez endorsed clinically significant somatic concerns and concerns about her cognition as well as strange experiences.    SUMMARY, DIAGNOSTIC IMPRESSIONS & RECOMMENDATIONS:    Ms. Rodriguez is a 63 y.o., right-handed,  female with a history of depression and panic disorder without agoraphobia, as well as a family history of Alzheimer's disease, who was referred for a neurocognitive evaluation by Martina Salgado MD (Neurology) to assist with differential diagnosis and care planning. She arrived to today's appointment unaccompanied and provided the details of her history as described below.  Optimal premorbid intellectual abilities were estimated as falling in the average range and Ms. Whalens performance was intact and commensurate with that estimate across measures of verbal and visual reasoning, expressive language abilities, visuoperception and nonverbal problem solving skills.  Within this context, she exhibited slight variability in attention and a degree of impulsivity that introduced a degree of variability into her cognitive profile. However,  Ms. Rodriguez exhibited broadly average to high average performances across measures of verbal and visual learning and memory (i.e., +% retention for verbal material, 63% retention for visual material).  She endorsed a tendency toward somatization, strange physical experiences, and significant concern about her cognition on a self-report measure of mood symptoms.    1)  Overall, the results of today's evaluation reveal broadly intact cognition. Ms. Rodriguez did exhibit slight variability in her attention and focus, as well as a degree of impulsivity at times during the evaluation, which introduced slight variability into her neurocognitive profile.  Ms. Rodriguez was slightly careless in her reproduction of 2-dimensional figures (on both memory and non-memory measures) and this tendency likely contributed to subtly weaker performance on a measure of visual memory. However, despite her concern about a family history of Alzheimer's disease, she may be reassured that she performed fully within the average to high average range across measures of verbal learning and memory. Her performance was otherwise intact across measures of expressive language, visuoperception, and executive functioning.    2) The pattern observed on neuropsychological testing does not raise significant concern about the presence of an underlying neurodegenerative condition. Rather, I suspect that Ms. Rodriguez's ongoing mood symptoms and environmental stressors contribute to undermine her attention and focus and thereby contribute to minor oversights in daily life. At this point in time, I do not see evidence to suggest the presence of an incipient neurodegenerative process.  However, these results will serve as a thorough basis for future comparison should she notice further declines in her cognition over time.    3) Ms. Rodriguez  is expected to benefit from the use of compensatory strategies in daily life, which will be discussed at length during the  feedback session. I will also talk with her about the benefits of participating in outpatient counseling to address her mood symptoms and reinforce some of the good coping strategies she has used in the past (i.e., and/or to teach her additional strategies).  I will also share information about mindfulness meditation as well as a number of smart phone rip's that could be of use in daily life. She will be encouraged to increase her engagement in cardiovascular exercise as well, as its benefits for mood, sleep and cognition are well-established.    DSM-V DIAGNOSES:  No Cognitive Diagnosis  Unspecified Depressive Disorder  Unspecified Anxiety Disorder    Ms. Rodriguez has requested to receive the results of this evaluation via a formal feedback appointment with me which will be scheduled at the patient's convenience, typically within two weeks of today's date.     Thank you for allowing me to participate in Ms. Rodriguez's care.  Please contact me with any questions regarding the content of this report.      Deepika Rinaldi, PhD, LP, ABPP  Clinical Neuropsychologist, [id number]  Board Certified in Clinical Neuropsychology    [facility]  Neuropsychology Section   Phone:  [phone]

## 2021-06-24 NOTE — PROGRESS NOTES
NEUROPSYCHOLOGY FEEDBACK NOTE     NAME: Candance M Brown                        YOB: 1955              DATE OF EVALUATION: 3/8/2019        SUMMARY OF SESSION:  Ms. Rodriguez is a 63 y.o., right-handed,  female with a history of depression and panic disorder without agoraphobia, as well as a family history of Alzheimer's disease, who was referred for a neurocognitive evaluation by Josh Nance MD (Neurology) to assist with differential diagnosis and care planning. She   Ms. Rodriguez arrived on time and unaccompanied. We began the session by discussing her experience during the neuropsychometric evaluation.  I provided Ms. Rodriguez with detailed feedback regarding her performance on cognitive testing and her pattern of cognitive strengths and weaknesses.  I discussed my overall impressions and recommendations and provided the opportunity for Ms. Rodriguez to ask any questions that she had about the evaluation.  We discussed health maintenance strategies including exercise and outpatient counseling. After an extensive discussion, during which it was quite clear that Ms. Rodriguez would benefit from the opportunity to process and develop more insight into psychological factors affecting her functioning, she indicated that she is not willing to seek therapy outside of pastoral counseling. Her baltazar is important to her and she is going to look to the Bible and God as her guide.  She also indicated that she intends to wean herself off of her antidepressant, which I encouraged her to reconsider. At the very least, she agreed to wait to do that until she has additional coping strategies/resources in place and would do it under the supervision of a physician.  t the end of the session, she indicated that she understood the results and that I had answered all of her questions.  She was provided with my contact information, should any further questions or concerns arise in the future.     Please contact me with any  "questions regarding the content of this note.      Deepika Rinaldi, PhD, LP, ABPP  Board Certified in [place] [address]  Phone: [phone]     For diagnostic and coding purposes, Candance M Brown has a history of  Unspecified depressive disorder and unspecified anxiety disorder.  Today's session consisted of a total of 50 minutes of interactive feedback and related activities  (1 x 96133 ) charged on  today's date.  Please note, that charges for  neuropsychological testing services  are to be entered on the last date of service. Therefore, the charges dropped today reflect all of neuropsychological \"testing services\" rendered on the first date of service (2/1/19) for codes 83651, 72443, 70231, 71913, 82741 and any additional charge for this feedback session.       Please see the report dated 2/1/19 for a detailed description of charges/time spent on the first date of service.  Codes for the neurobehavioral status exam (75322 and 92446) were entered on the first date of service (2/1/19).  "

## 2021-07-03 NOTE — ADDENDUM NOTE
Addendum Note by Saman Triana DO at 10/10/2018 11:30 AM     Author: Saman Triana DO Service: -- Author Type: Physician    Filed: 10/10/2018 11:30 AM Encounter Date: 10/10/2018 Status: Signed    : Saman Triana DO (Physician)    Addended by: SAMAN TRIANA on: 10/10/2018 11:30 AM        Modules accepted: Orders

## 2021-07-03 NOTE — ADDENDUM NOTE
Addendum Note by Deepika Rinaldi, Ph.D,LP at 2/1/2019 11:59 PM     Author: Deepika Rinaldi, Ph.D,LP Service: -- Author Type: Psychologist    Filed: 3/8/2019  1:56 PM Date of Service: 2/1/2019 11:59 PM Status: Signed    : Deepika Rinaldi, Ph.D,LP (Psychologist)    Encounter addended by: Deepika Rinaldi, Ph.D,LP on: 3/8/2019    1:56 PM      Actions taken: Delete clinical note, Charge Capture section accepted,   Sign clinical note

## 2021-09-07 ENCOUNTER — TELEPHONE (OUTPATIENT)
Dept: FAMILY MEDICINE | Facility: CLINIC | Age: 66
End: 2021-09-07

## 2021-09-07 ENCOUNTER — ALLIED HEALTH/NURSE VISIT (OUTPATIENT)
Dept: FAMILY MEDICINE | Facility: CLINIC | Age: 66
End: 2021-09-07
Payer: MEDICARE

## 2021-09-07 DIAGNOSIS — Z20.822 EXPOSURE TO COVID-19 VIRUS: Primary | ICD-10-CM

## 2021-09-07 DIAGNOSIS — Z20.822 EXPOSURE TO COVID-19 VIRUS: ICD-10-CM

## 2021-09-07 PROCEDURE — U0005 INFEC AGEN DETEC AMPLI PROBE: HCPCS

## 2021-09-07 PROCEDURE — U0003 INFECTIOUS AGENT DETECTION BY NUCLEIC ACID (DNA OR RNA); SEVERE ACUTE RESPIRATORY SYNDROME CORONAVIRUS 2 (SARS-COV-2) (CORONAVIRUS DISEASE [COVID-19]), AMPLIFIED PROBE TECHNIQUE, MAKING USE OF HIGH THROUGHPUT TECHNOLOGIES AS DESCRIBED BY CMS-2020-01-R: HCPCS

## 2021-09-07 NOTE — TELEPHONE ENCOUNTER
"Patient is requesting COVID-19 PCR testing. They are currently asymptomatic, and meet the following criteria for testing per the July 20, 2020 [facility] testing guidelines: [facility] asymptomatic testing criteria: Close contacts (within 6 feet for >/= 15min) of Covid-19 cases    If patient has had close contact, recommend PCR testing 5-7 after exposure and provide following quarantine recommendations per [place] Department of Health:    If you have recovered from COVID-19 in the past 90 days and have close contact with someone with COVID-19, you do not need to quarantine if ALL of the following are true:  Your illness was confirmed with a positive lab test in the past 90 days.  You have fully recovered.  You do not currently have any symptoms of COVID-19.    If you are fully vaccinated and have close contact with someone with COVID-19, you do not need to quarantine if BOTH of the following are true:  It has been at least two weeks since your last dose of vaccine.  You do not currently have any symptoms of COVID-19.    If neither of these is true, recommend the following (day of exposure is considered day 0):      You should stay away from others for 14 days if:  You live in a building with other people, where it's hard to stay away from others and easy to spread the virus to multiple people, like a long-term care facility.  You have traveled outside of [place], other than crossing the border for work, study, medical care, or personal safety or security. Note: Your travel \"exposure\" period ends upon arrival back home.      You may consider being around others after 10 days if:  You have not had any symptoms.  You have not had a positive test for COVID-19.  No one in your home has COVID-19.  You do not live or work in a building where it's hard to stay away from others and easy to spread the virus to multiple people, like a long-term care facility.  Your contact with someone with COVID-19 had a beginning " "and an end. For example, your close contact happened at:  - School  - Sports event  - Work  - Social gathering  You traveled outside of [place] for reasons other than crossing the border for work, study, medical care, or personal safety or security, and all of the above are true. Note: Your \"exposure\" period ends upon arrival back home.    Even after 10 days you must still:  Watch for symptoms through day 14. If you have any symptoms, stay home, separate yourself from others, and get tested right away.  Continue to wear a mask and stay at least 6 feet away from other people.      You may consider being around others after seven days only if:  You get tested for COVID-19 at least five full days after you had close contact with someone with COVID-19, and the test is negative.  You have not had any symptoms.  You have not had a positive test for COVID-19.  No one in your home has COVID-19.  You do not live or work in a building where it's hard to stay away from others and easy to spread the virus to multiple people, like a long-term care facility.  Your contact with someone with COVID-19 had a beginning and an end. For example, your close contact happened at:  - School  - Sports event  - Work  - Social gathering  You traveled outside of [place] for reasons other than crossing the border for work, study, medical care, or personal safety or security, and all of the above are true.    Even after seven days you must still:  Watch for symptoms through day 14. If you have any symptoms, stay home, separate yourself from others, and get tested right away.  Continue to wear a mask and stay at least 6 feet away from other people.      Patient transferred to  to schedule 20 min test only (no charge) visit with [facility] provider or curbside test with [facility].     Source: https://www.health.[facility].[place]/diseases/coronavirus/close.html    "

## 2021-09-08 LAB — SARS-COV-2 RNA RESP QL NAA+PROBE: NEGATIVE

## 2021-09-10 ENCOUNTER — OFFICE VISIT (OUTPATIENT)
Dept: FAMILY MEDICINE | Facility: CLINIC | Age: 66
End: 2021-09-10
Payer: MEDICARE

## 2021-09-10 VITALS
SYSTOLIC BLOOD PRESSURE: 127 MMHG | TEMPERATURE: 98.5 F | BODY MASS INDEX: 22.85 KG/M2 | WEIGHT: 135.2 LBS | DIASTOLIC BLOOD PRESSURE: 72 MMHG | OXYGEN SATURATION: 95 % | HEART RATE: 74 BPM

## 2021-09-10 DIAGNOSIS — R05.9 COUGH: ICD-10-CM

## 2021-09-10 DIAGNOSIS — Z20.822 ENCOUNTER FOR LABORATORY TESTING FOR COVID-19 VIRUS: Primary | ICD-10-CM

## 2021-09-10 DIAGNOSIS — K21.9 GASTROESOPHAGEAL REFLUX DISEASE, UNSPECIFIED WHETHER ESOPHAGITIS PRESENT: ICD-10-CM

## 2021-09-10 DIAGNOSIS — K21.9 GASTROESOPHAGEAL REFLUX DISEASE WITHOUT ESOPHAGITIS: ICD-10-CM

## 2021-09-10 PROCEDURE — 99213 OFFICE O/P EST LOW 20 MIN: CPT | Mod: CS | Performed by: STUDENT IN AN ORGANIZED HEALTH CARE EDUCATION/TRAINING PROGRAM

## 2021-09-10 PROCEDURE — U0005 INFEC AGEN DETEC AMPLI PROBE: HCPCS | Performed by: STUDENT IN AN ORGANIZED HEALTH CARE EDUCATION/TRAINING PROGRAM

## 2021-09-10 PROCEDURE — U0003 INFECTIOUS AGENT DETECTION BY NUCLEIC ACID (DNA OR RNA); SEVERE ACUTE RESPIRATORY SYNDROME CORONAVIRUS 2 (SARS-COV-2) (CORONAVIRUS DISEASE [COVID-19]), AMPLIFIED PROBE TECHNIQUE, MAKING USE OF HIGH THROUGHPUT TECHNOLOGIES AS DESCRIBED BY CMS-2020-01-R: HCPCS | Performed by: STUDENT IN AN ORGANIZED HEALTH CARE EDUCATION/TRAINING PROGRAM

## 2021-09-10 RX ORDER — BENZONATATE 200 MG/1
200 CAPSULE ORAL 2 TIMES DAILY PRN
Qty: 30 CAPSULE | Refills: 1 | Status: SHIPPED | OUTPATIENT
Start: 2021-09-10

## 2021-09-10 RX ORDER — FAMOTIDINE 20 MG/1
20 TABLET, FILM COATED ORAL 2 TIMES DAILY PRN
Qty: 180 TABLET | Refills: 1 | Status: SHIPPED | OUTPATIENT
Start: 2021-09-10

## 2021-09-10 NOTE — PROGRESS NOTES
Preceptor Attestation:    I discussed the patient with the resident and evaluated the patient in person. I have verified the content of the note, which accurately reflects my assessment of the patient and the plan of care.   Supervising Physician:  Darinel Dorman MD.

## 2021-09-10 NOTE — PROGRESS NOTES
Assessment & Plan     Encounter for laboratory testing for COVID-19 virus  Possibility of Covid, with positive Covid contacts, will swab today.  Covid cautions with the patient until results return.  - Symptomatic COVID-19 Virus (Coronavirus) by PCR Nose  - benzonatate (TESSALON) 200 MG capsule  Dispense: 30 capsule; Refill: 1    Cough  Gastroesophageal reflux disease without esophagitis  Cough possibly associated with GERD, will trial famotidine and Tessalon Perles.  No concern for strep throat, differential for cough includes Covid, seasonal allergies, viral URI, GERD.  Covid test as above.  - benzonatate (TESSALON) 200 MG capsule  Dispense: 30 capsule; Refill: 1  - famotidine (PEPCID) 20 MG tablet  Dispense: 180 tablet; Refill: 1    Ordering of each unique test  Prescription drug management     See Patient Instructions    Return if symptoms worsen or fail to improve.    Options for treatment and follow-up care were reviewed with the patient who was engaged and actively involved in the decision making process, verbalized understanding of the options discussed, and satisfied with the final plan.    Patient was staffed with supervising physician, Dr. Darinel Dorman,    Jason Herrera DO, PGY-2  [facility]    Subjective   Candance M Brown is a 66 year old year old female who presents for the following health issues   No past medical history on file.  Chief Complaint   Patient presents with     RECHECK     expose to covid/ sinus been bothering about 4-5 days per pt      other     cough started yesterday/ want to tested for covid-19 per pt    Asymptomatic COVID test 9/7/21, developed cough x1 day.  Initially got tested because she was exposed to her granddaughter who had a positive contact, granddaughter initially tested negative but developed symptoms of low-grade fever and cough and we tested positive few days later.  Granddaughter lives with patient, patient is fully vaccinated for Covid.    Patient  reports dry cough, but no chest tightness, wheezing, chest pain, dyspnea, nasal congestion. Denies fever, chills, myalgias, sore throat, ear pain, gait issues, headache, dizziness.     Remote history of GERD, reports some sour taste in mouth in the morning.  Cough does not worsen at night, no chest pain.    Review of Systems:  Constitutional, HEENT, cardiovascular, pulmonary, GI, , musculoskeletal, neuro, skin, endocrine and psych systems are negative, except as otherwise noted.      Objective    /72   Pulse 74   Temp 98.5  F (36.9  C) (Oral)   Wt 61.3 kg (135 lb 3.2 oz)   SpO2 95%   BMI 22.85 kg/m    Body mass index is 22.85 kg/m .    Physical Exam   GENERAL: healthy, alert and no distress, sporadic cough  EYES: Eyes grossly normal to inspection, PERRL and conjunctivae and sclerae normal  HENT: ear canals and TM's normal, nose and mouth without ulcers or lesions.  No oropharynx erythema, good dentition.  NECK: no adenopathy, no asymmetry, masses, or scars and thyroid normal to palpation  RESP: lungs clear to auscultation - no rales, rhonchi or wheezes  CV: regular rate and rhythm, normal S1 S2, no S3 or S4, no murmur, click or rub, no peripheral edema and peripheral pulses strong  Abd: Nontender abdomen.     ----- Service Performed and Documented by Resident or Fellow ------

## 2021-09-10 NOTE — PATIENT INSTRUCTIONS
Thank you for coming into the clinic today!  Here is what we discussed:    Remain in isolation until your COVID test results in 1-3 days.    Take the tessalon perles if your cough does not improve     Take Famotidine twice a day for 2 days for the next 1-2 weeks to help with your cough    Increase water intake and food intake while your body clears this viral illness    If you have any questions, please call the clinic at [phone].     [facility] Isolation criteria if you test positive for COVID, all 3 must be met before ending your isolation:  - 10 days since symptom onset on 9/8/21  - 24 hrs without a fever without use of tylenol, ibuprofen, or other antipyretic  - improvement in other symptoms    If you never developed symptoms of COVID but had a positive COVID test, then remain in isolation until 10 days after your first positive COVID test.     Patient Education     Chronic Cough with Uncertain Cause (Adult)  Everyone has had a cough as part of the common cold, flu, or bronchitis. This kind of cough occurs along with an achy feeling, low-grade fever, nasal and sinus congestion, and a scratchy or sore throat. This usually gets better in 2 to 3 weeks. A cough that lasts longer than 3 weeks may be due to other causes. Your healthcare provider may refer to this as a chronic cough.   If your cough does not improve over the next 2 weeks, further testing may be needed. Follow up with your healthcare provider as advised. Cough suppressants may be recommended. Based on your exam today, the exact cause of your cough is not certain. Below are some common causes for persistent cough.   Smoker's cough  Smoker s cough doesn t go away. If you continue to smoke, it only gets worse. The cough is from irritation in the air passages. Talk to your healthcare provider about quitting. Medicines or nicotine-replacement products, like gum or the patch, may make quitting easier.   Postnasal drip  A cough that is worse at night may be due  to postnasal drip. Excess mucus in the nose drains from the back of your nose to your throat. This triggers the cough reflex. Postnasal drip may be due to a sinus infection or allergy. Common allergens include dust, tobacco smoke (both inhaled and secondhand smoke), environmental pollutants, pollen, mold, pets, cleaning agents, room deodorizers, and chemical fumes. Over-the-counter antihistamines or decongestants may be helpful for allergies. A sinus infection may requires antibiotic treatment. See your healthcare provider if symptoms continue.     Medicines  Certain prescribed medicines can cause a chronic cough in some people:    ACE inhibitors for high blood pressure. These include benazepril, captopril, enalapril, fosinopril, lisinopril, quinapril, ramipril, and others.    Beta-blockers for high blood pressure and other conditions. These include propranolol, atenolol, metoprolol, nadolol, and others.  Let your healthcare provider know if you are taking any of these. The chronic cough may mean your medicine needs to be changed.   Asthma  Cough may be the only sign of mild asthma. You may have tests to find out if asthma is causing your cough. You may also take asthma medicine on a trial basis.   Acid reflux (heartburn, GERD)  The esophagus is the tube that carries food from the mouth to the stomach. A valve at its lower end prevents stomach acids from flowing upward. If this valve does not work properly, acid from the stomach enters the esophagus. This may cause a burning pain in the upper abdomen or lower chest, belching, or cough. Symptoms are often worse when lying flat. Avoid eating or drinking before bedtime. Try using extra pillows to raise your upper body, or place 4-inch blocks under the head of your bed. You may try an over-the-counter (OTC) antacid or an acid-blocking medicine such as famotidine, cimetidine, esomeprazole, lansoprazole, or omeprazole. Stronger medicines for this condition can be  prescribed by your healthcare provider. Ask your healthcare provider which OTC medicine to use. Depending on your current medicines, some OTC medicines may cause drug interactions and should be avoided.   Follow-up care  Follow up with your healthcare provider, or as advised, if your cough does not improve. Further testing may be needed.   Note: If an X-ray was taken, a specialist will review it. You will be notified of any new findings that may affect your care.   When to seek medical advice  Call your healthcare provider right away if any of these occur:    Mild wheezing or difficulty breathing    Fever of 100.4 F (38 C) or higher, or as directed by your healthcare provider    Unexpected weight loss    Coughing up large amounts of colored sputum or blood-tinged sputum    Night sweats (sheets and pajamas get soaking wet)  Call 911  Call 911 if any of these occur:     Coughing up blood    Moderate to severe trouble breathing or wheezing  [employer] last reviewed this educational content on 6/1/2018 2000-2021 [employer]. All rights reserved. This information is not intended as a substitute for professional medical care. Always follow your healthcare professional's instructions.

## 2021-09-11 LAB — SARS-COV-2 RNA RESP QL NAA+PROBE: NEGATIVE

## 2021-09-20 ENCOUNTER — LAB (OUTPATIENT)
Dept: FAMILY MEDICINE | Facility: CLINIC | Age: 66
End: 2021-09-20
Payer: MEDICARE

## 2021-09-20 DIAGNOSIS — Z20.822 ENCOUNTER FOR LABORATORY TESTING FOR COVID-19 VIRUS: Primary | ICD-10-CM

## 2021-09-20 DIAGNOSIS — Z20.822 ENCOUNTER FOR LABORATORY TESTING FOR COVID-19 VIRUS: ICD-10-CM

## 2021-09-20 PROCEDURE — U0005 INFEC AGEN DETEC AMPLI PROBE: HCPCS

## 2021-09-20 PROCEDURE — U0003 INFECTIOUS AGENT DETECTION BY NUCLEIC ACID (DNA OR RNA); SEVERE ACUTE RESPIRATORY SYNDROME CORONAVIRUS 2 (SARS-COV-2) (CORONAVIRUS DISEASE [COVID-19]), AMPLIFIED PROBE TECHNIQUE, MAKING USE OF HIGH THROUGHPUT TECHNOLOGIES AS DESCRIBED BY CMS-2020-01-R: HCPCS

## 2021-09-20 PROCEDURE — 99207 PR NO CHARGE LOS: CPT

## 2021-09-21 LAB — SARS-COV-2 RNA RESP QL NAA+PROBE: NEGATIVE

## 2021-10-30 ENCOUNTER — HEALTH MAINTENANCE LETTER (OUTPATIENT)
Age: 66
End: 2021-10-30

## 2022-10-22 ENCOUNTER — HEALTH MAINTENANCE LETTER (OUTPATIENT)
Age: 67
End: 2022-10-22

## 2023-11-04 ENCOUNTER — HEALTH MAINTENANCE LETTER (OUTPATIENT)
Age: 68
End: 2023-11-04